# Patient Record
Sex: MALE | Race: WHITE | NOT HISPANIC OR LATINO | Employment: OTHER | URBAN - METROPOLITAN AREA
[De-identification: names, ages, dates, MRNs, and addresses within clinical notes are randomized per-mention and may not be internally consistent; named-entity substitution may affect disease eponyms.]

---

## 2017-12-05 ENCOUNTER — OFFICE VISIT (OUTPATIENT)
Dept: OTOLARYNGOLOGY | Facility: CLINIC | Age: 72
End: 2017-12-05
Payer: MEDICARE

## 2017-12-05 VITALS — BODY MASS INDEX: 24.34 KG/M2 | WEIGHT: 160.06 LBS

## 2017-12-05 DIAGNOSIS — G90.2 ACQUIRED HORNER'S SYNDROME: Primary | ICD-10-CM

## 2017-12-05 PROCEDURE — 99999 PR PBB SHADOW E&M-NEW PATIENT-LVL III: CPT | Mod: PBBFAC,,, | Performed by: OTOLARYNGOLOGY

## 2017-12-05 PROCEDURE — 99204 OFFICE O/P NEW MOD 45 MIN: CPT | Mod: S$PBB,,, | Performed by: OTOLARYNGOLOGY

## 2017-12-05 PROCEDURE — 99203 OFFICE O/P NEW LOW 30 MIN: CPT | Mod: PBBFAC | Performed by: OTOLARYNGOLOGY
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
## 2017-12-06 ENCOUNTER — TELEPHONE (OUTPATIENT)
Dept: OPHTHALMOLOGY | Facility: CLINIC | Age: 72
End: 2017-12-06

## 2017-12-06 NOTE — TELEPHONE ENCOUNTER
----- Message from Diana Plata RN sent at 12/6/2017 10:57 AM CST -----  Regarding: referral  Mr. Cortez has a referral placed by Dr. Frausto to see Dr. Ibrahim regarding Agatha's syndrome, eyelid asymmetry. Please call pt to place an appt.     Thank you  Diana Plata RN  [id number]

## 2018-02-08 ENCOUNTER — INITIAL CONSULT (OUTPATIENT)
Dept: OPHTHALMOLOGY | Facility: CLINIC | Age: 73
End: 2018-02-08
Payer: MEDICARE

## 2018-02-08 ENCOUNTER — CLINICAL SUPPORT (OUTPATIENT)
Dept: OPHTHALMOLOGY | Facility: CLINIC | Age: 73
End: 2018-02-08
Payer: MEDICARE

## 2018-02-08 DIAGNOSIS — H02.402 ACQUIRED PTOSIS OF LEFT EYELID: ICD-10-CM

## 2018-02-08 DIAGNOSIS — G90.2 HORNER'S SYNDROME: Primary | ICD-10-CM

## 2018-02-08 DIAGNOSIS — D31.32 CHOROIDAL NEVUS OF LEFT EYE: ICD-10-CM

## 2018-02-08 PROCEDURE — 92285 EXTERNAL OCULAR PHOTOGRAPHY: CPT | Mod: PBBFAC | Performed by: OPHTHALMOLOGY

## 2018-02-08 PROCEDURE — 92004 COMPRE OPH EXAM NEW PT 1/>: CPT | Mod: S$PBB,,, | Performed by: OPHTHALMOLOGY

## 2018-02-08 PROCEDURE — 99211 OFF/OP EST MAY X REQ PHY/QHP: CPT | Mod: PBBFAC,25,27

## 2018-02-08 PROCEDURE — 99999 PR PBB SHADOW E&M-EST. PATIENT-LVL II: CPT | Mod: PBBFAC,,, | Performed by: OPHTHALMOLOGY

## 2018-02-08 PROCEDURE — 99212 OFFICE O/P EST SF 10 MIN: CPT | Mod: PBBFAC,25 | Performed by: OPHTHALMOLOGY

## 2018-02-08 PROCEDURE — 99999 PR PBB SHADOW E&M-EST. PATIENT-LVL I: CPT | Mod: PBBFAC,,,

## 2018-02-08 PROCEDURE — 92082 INTERMEDIATE VISUAL FIELD XM: CPT | Mod: PBBFAC | Performed by: OPHTHALMOLOGY
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
## 2018-02-08 NOTE — LETTER
February 14, 2018      Ant Frausto MD  [address]  [facility] [address]           [facility]  [address]  [facility] [address]  Phone: [phone]  Fax: [phone]          Patient: Derick Cortez   MR Number: [id number]   YOB: 1945   Date of Visit: 2/8/2018       Dear Dr. Ant Frausto:    Thank you for referring Derick Cortez to me for evaluation. Attached you will find relevant portions of my assessment and plan of care.    If you have questions, please do not hesitate to call me. I look forward to following Derick Cortez along with you.    Sincerely,    Ant Harper  CC:  No Recipients    If you would like to receive this communication electronically, please contact [email or web address] or [phone] to request more information on [employer] access.    For providers and/or their staff who would like to refer a patient to [facility], please contact us through our one-stop-shop provider referral line, [facility] , at [phone].    If you feel you have received this communication in error or would no longer like to receive these types of communications, please e-mail [email or web address]

## 2018-02-21 ENCOUNTER — TELEPHONE (OUTPATIENT)
Dept: OPHTHALMOLOGY | Facility: CLINIC | Age: 73
End: 2018-02-21

## 2018-02-21 DIAGNOSIS — H02.402 ACQUIRED PTOSIS OF EYELID, LEFT: Primary | ICD-10-CM

## 2018-02-28 ENCOUNTER — INITIAL CONSULT (OUTPATIENT)
Dept: OPHTHALMOLOGY | Facility: CLINIC | Age: 73
End: 2018-02-28
Payer: MEDICARE

## 2018-02-28 DIAGNOSIS — H35.413 LATTICE DEGENERATION OF BOTH RETINAS: ICD-10-CM

## 2018-02-28 DIAGNOSIS — D31.32 CHOROIDAL NEVUS, LEFT EYE: Primary | ICD-10-CM

## 2018-02-28 PROCEDURE — 92014 COMPRE OPH EXAM EST PT 1/>: CPT | Mod: S$PBB,,, | Performed by: OPHTHALMOLOGY

## 2018-02-28 PROCEDURE — 92225 PR SPECIAL EYE EXAM, INITIAL: CPT | Mod: 50,PBBFAC | Performed by: OPHTHALMOLOGY

## 2018-02-28 PROCEDURE — 99212 OFFICE O/P EST SF 10 MIN: CPT | Mod: PBBFAC,25 | Performed by: OPHTHALMOLOGY

## 2018-02-28 PROCEDURE — 92225 PR SPECIAL EYE EXAM, INITIAL: CPT | Mod: 50,S$PBB,, | Performed by: OPHTHALMOLOGY

## 2018-02-28 PROCEDURE — 76512 OPH US DX B-SCAN: CPT | Mod: 50,PBBFAC | Performed by: OPHTHALMOLOGY

## 2018-02-28 PROCEDURE — 99999 PR PBB SHADOW E&M-EST. PATIENT-LVL II: CPT | Mod: PBBFAC,,, | Performed by: OPHTHALMOLOGY

## 2018-02-28 NOTE — LETTER
March 4, 2018      Erlinda Ibrahim MD  [address]  [facility] [address]           [facility]  [address]  [facility] [address]  Phone: [phone]  Fax: [phone]          Patient: Derick Cortez   MR Number: [id number]   YOB: 1945   Date of Visit: 2/28/2018       Dear Dr. Erlinda Ibrahim:    Thank you for referring Derick Cortez to me for evaluation. Attached you will find relevant portions of my assessment and plan of care.    If you have questions, please do not hesitate to call me. I look forward to following Derick Cortez along with you.    Sincerely,    Dawit Manriquez MD    Enclosure  CC:  No Recipients    If you would like to receive this communication electronically, please contact [email or web address] or [phone] to request more information on [employer] access.    For providers and/or their staff who would like to refer a patient to [facility], please contact us through our one-stop-shop provider referral line, [facility], at [phone].    If you feel you have received this communication in error or would no longer like to receive these types of communications, please e-mail [email or web address]

## 2018-02-28 NOTE — PROGRESS NOTES
HPI     Eye Problem    Additional comments: referred by Dr. Ibrahim           Comments   DLS:  2/8/18 Dr. Ibrahim- pt referred for eval of choroidal nevus OS    Pt states he was unaware of any probs with his retina.  Denies F/F.  Pt   gets yearly exams and not told of anything.  Set up for surgery with Dr. Ibrahim for 5/11/18 for his lid.    Va good OU.  No pain.  No new distortions OU.    POHX:  Agatha's syndrome             Last edited by Dawit Manriquez MD on 3/4/2018  4:56 PM. (History)        Bscan:  OD: good quality scan, 1.53 mm elevation isodense choroidal elevation without fluid or excavation    Assessment /Plan     For exam results, see Encounter Report.    Choroidal nevus, left eye  -     Color Fundus Photography - OU - Both Eyes; Future; Expected date: 02/28/2018  -     B Scan    Lattice degeneration of both retinas    Other orders  -     Cancel: OCT, Retina - OU - Both Eyes; Future; Expected date: 02/28/2018      Nevus with benign features, slight elevation  Recommend observation  RTC 3 months, sooner PRN    Pathology of PVD, Retinal Tear, Retinal Detachment reviewed in great detail  RD precautions discussed in detail, patient expressed understanding  RTC  sooner PRN (especially ANY change flashes, floaters, vision, visual field)

## 2018-03-07 ENCOUNTER — OFFICE VISIT (OUTPATIENT)
Dept: CARDIOLOGY | Facility: CLINIC | Age: 73
End: 2018-03-07
Attending: INTERNAL MEDICINE
Payer: MEDICARE

## 2018-03-07 VITALS
SYSTOLIC BLOOD PRESSURE: 126 MMHG | HEART RATE: 69 BPM | DIASTOLIC BLOOD PRESSURE: 81 MMHG | HEIGHT: 68 IN | BODY MASS INDEX: 23.64 KG/M2 | WEIGHT: 156 LBS

## 2018-03-07 DIAGNOSIS — I10 ESSENTIAL HYPERTENSION: ICD-10-CM

## 2018-03-07 DIAGNOSIS — N18.4 CHRONIC RENAL FAILURE, STAGE 4 (SEVERE): Primary | ICD-10-CM

## 2018-03-07 PROCEDURE — 99213 OFFICE O/P EST LOW 20 MIN: CPT | Mod: S$GLB,,, | Performed by: INTERNAL MEDICINE

## 2018-03-07 NOTE — PROGRESS NOTES
Subjective:    Patient ID:  Derick Cortez is a 72 y.o. male     HPI  Here for F/U of HBP and CRF.    I feel well. I work out regularly. No complaints.    Current Outpatient Prescriptions   Medication Sig    amlodipine (NORVASC) 10 MG tablet Take 10 mg by mouth once daily.    aspirin (ECOTRIN) 81 MG EC tablet Take 81 mg by mouth once daily.    CRESTOR 20 mg tablet     ergocalciferol (ERGOCALCIFEROL) 50,000 unit Cap     levothyroxine (SYNTHROID) 50 MCG tablet Take 50 mcg by mouth once daily.    metoprolol succinate (TOPROL-XL) 25 MG 24 hr tablet     rosuvastatin (CRESTOR) 10 MG tablet Take 10 mg by mouth once daily.     No current facility-administered medications for this visit.          Review of Systems   Constitution: Negative for chills, decreased appetite, fever, weight gain and weight loss.   HENT: Negative for congestion, hearing loss and sore throat.    Eyes: Negative for blurred vision, double vision and visual disturbance.   Cardiovascular: Negative for chest pain, claudication, dyspnea on exertion, leg swelling, palpitations and syncope.   Respiratory: Negative for cough, hemoptysis, shortness of breath, sputum production and wheezing.    Endocrine: Negative for cold intolerance and heat intolerance.   Hematologic/Lymphatic: Negative for bleeding problem. Does not bruise/bleed easily.   Skin: Negative for color change, dry skin, flushing and itching.   Musculoskeletal: Negative for back pain, joint pain and myalgias.   Gastrointestinal: Negative for abdominal pain, anorexia, constipation, diarrhea, dysphagia, nausea and vomiting.        No bleeding per rectum   Genitourinary: Negative for dysuria, flank pain, frequency, hematuria and nocturia.   Neurological: Negative for dizziness, headaches, light-headedness, loss of balance, seizures and tremors.   Psychiatric/Behavioral: Negative for altered mental status and depression.         Vitals:    03/07/18 1501   BP: 126/81   Pulse: 69   Weight: 70.8  "kg (156 lb)   Height: 5' 8" (1.727 m)     Objective:    Physical Exam   Constitutional: He is oriented to person, place, and time. He appears well-developed and well-nourished.   HENT:   Head: Normocephalic and atraumatic.   Right Ear: External ear normal.   Left Ear: External ear normal.   Nose: Nose normal.   Eyes: Conjunctivae and EOM are normal. Pupils are equal, round, and reactive to light. No scleral icterus.   Neck: Normal range of motion. Neck supple. No JVD present. No tracheal deviation present. No thyromegaly present.   Cardiovascular: Normal rate, regular rhythm and normal heart sounds.  Exam reveals no gallop and no friction rub.    No murmur heard.  Pulmonary/Chest: Effort normal and breath sounds normal. No respiratory distress. He has no rales. He exhibits no tenderness.   Abdominal: Soft. Bowel sounds are normal. He exhibits no distension and no mass. There is no tenderness.   Musculoskeletal: Normal range of motion. He exhibits no edema or tenderness.   Lymphadenopathy:     He has no cervical adenopathy.   Neurological: He is alert and oriented to person, place, and time. He has normal reflexes. No cranial nerve deficit. Coordination normal.   Skin: Skin is warm and dry. No rash noted.   Psychiatric: He has a normal mood and affect. His behavior is normal.         Assessment:       1. Chronic renal failure, stage 4 (severe)    2. Essential hypertension         Plan:       Stable  Continue the same            "

## 2018-03-26 ENCOUNTER — PATIENT MESSAGE (OUTPATIENT)
Dept: SURGERY | Facility: HOSPITAL | Age: 73
End: 2018-03-26

## 2018-05-10 ENCOUNTER — ANESTHESIA EVENT (OUTPATIENT)
Dept: SURGERY | Facility: HOSPITAL | Age: 73
End: 2018-05-10
Payer: MEDICARE

## 2018-05-10 ENCOUNTER — TELEPHONE (OUTPATIENT)
Dept: OPHTHALMOLOGY | Facility: CLINIC | Age: 73
End: 2018-05-10
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
## 2018-05-11 ENCOUNTER — HOSPITAL ENCOUNTER (OUTPATIENT)
Facility: HOSPITAL | Age: 73
Discharge: HOME | End: 2018-05-11
Attending: OPHTHALMOLOGY | Admitting: OPHTHALMOLOGY
Payer: MEDICARE

## 2018-05-11 ENCOUNTER — SURGERY (OUTPATIENT)
Age: 73
End: 2018-05-11

## 2018-05-11 ENCOUNTER — ANESTHESIA (OUTPATIENT)
Dept: SURGERY | Facility: HOSPITAL | Age: 73
End: 2018-05-11
Payer: MEDICARE

## 2018-05-11 VITALS
SYSTOLIC BLOOD PRESSURE: 127 MMHG | HEIGHT: 68 IN | HEART RATE: 68 BPM | TEMPERATURE: 98 F | OXYGEN SATURATION: 97 % | DIASTOLIC BLOOD PRESSURE: 73 MMHG | RESPIRATION RATE: 16 BRPM | BODY MASS INDEX: 23.65 KG/M2 | WEIGHT: 156.06 LBS

## 2018-05-11 DIAGNOSIS — H02.409 PTOSIS: ICD-10-CM

## 2018-05-11 DIAGNOSIS — H02.402 PTOSIS OF LEFT EYELID: Primary | ICD-10-CM

## 2018-05-11 DIAGNOSIS — H02.402 ACQUIRED PTOSIS OF EYELID, LEFT: ICD-10-CM

## 2018-05-11 PROCEDURE — 36000706: Performed by: OPHTHALMOLOGY

## 2018-05-11 PROCEDURE — 37000009 HC ANESTHESIA EA ADD 15 MINS: Performed by: OPHTHALMOLOGY

## 2018-05-11 PROCEDURE — 71000015 HC POSTOP RECOV 1ST HR: Performed by: OPHTHALMOLOGY

## 2018-05-11 PROCEDURE — 25000003 PHARM REV CODE 250: Performed by: OPHTHALMOLOGY

## 2018-05-11 PROCEDURE — 63600175 PHARM REV CODE 636 W HCPCS: Performed by: NURSE ANESTHETIST, CERTIFIED REGISTERED

## 2018-05-11 PROCEDURE — 71000033 HC RECOVERY, INTIAL HOUR: Performed by: OPHTHALMOLOGY

## 2018-05-11 PROCEDURE — 67903 REPAIR EYELID DEFECT: CPT | Mod: E1,,, | Performed by: OPHTHALMOLOGY

## 2018-05-11 PROCEDURE — 37000008 HC ANESTHESIA 1ST 15 MINUTES: Performed by: OPHTHALMOLOGY

## 2018-05-11 PROCEDURE — 25000003 PHARM REV CODE 250: Performed by: NURSE ANESTHETIST, CERTIFIED REGISTERED

## 2018-05-11 PROCEDURE — 36000707: Performed by: OPHTHALMOLOGY

## 2018-05-11 PROCEDURE — D9220A PRA ANESTHESIA: Mod: CRNA,,, | Performed by: NURSE ANESTHETIST, CERTIFIED REGISTERED

## 2018-05-11 PROCEDURE — D9220A PRA ANESTHESIA: Mod: ANES,,, | Performed by: ANESTHESIOLOGY

## 2018-05-11 PROCEDURE — S0020 INJECTION, BUPIVICAINE HYDRO: HCPCS | Performed by: OPHTHALMOLOGY

## 2018-05-11 PROCEDURE — 71000016 HC POSTOP RECOV ADDL HR: Performed by: OPHTHALMOLOGY

## 2018-05-11 RX ORDER — TOBRAMYCIN AND DEXAMETHASONE 3; 1 MG/ML; MG/ML
1 SUSPENSION/ DROPS OPHTHALMIC 4 TIMES DAILY
Qty: 5 ML | Refills: 0 | Status: SHIPPED | OUTPATIENT
Start: 2018-05-11 | End: 2018-05-31

## 2018-05-11 RX ORDER — TETRACAINE HYDROCHLORIDE 5 MG/ML
1 SOLUTION OPHTHALMIC ONCE
Status: DISCONTINUED | OUTPATIENT
Start: 2018-05-11 | End: 2018-05-11 | Stop reason: HOSPADM

## 2018-05-11 RX ORDER — HYDROMORPHONE HYDROCHLORIDE 1 MG/ML
0.2 INJECTION, SOLUTION INTRAMUSCULAR; INTRAVENOUS; SUBCUTANEOUS EVERY 5 MIN PRN
Status: DISCONTINUED | OUTPATIENT
Start: 2018-05-11 | End: 2018-05-11 | Stop reason: HOSPADM

## 2018-05-11 RX ORDER — BUPIVACAINE HYDROCHLORIDE 5 MG/ML
INJECTION, SOLUTION EPIDURAL; INTRACAUDAL
Status: DISCONTINUED | OUTPATIENT
Start: 2018-05-11 | End: 2018-05-11 | Stop reason: HOSPADM

## 2018-05-11 RX ORDER — LORAZEPAM 2 MG/ML
0.25 INJECTION INTRAMUSCULAR ONCE AS NEEDED
Status: DISCONTINUED | OUTPATIENT
Start: 2018-05-11 | End: 2018-05-11 | Stop reason: HOSPADM

## 2018-05-11 RX ORDER — MIDAZOLAM HYDROCHLORIDE 1 MG/ML
INJECTION, SOLUTION INTRAMUSCULAR; INTRAVENOUS
Status: DISCONTINUED | OUTPATIENT
Start: 2018-05-11 | End: 2018-05-11

## 2018-05-11 RX ORDER — LIDOCAINE HCL/EPINEPHRINE/PF 2%-1:200K
VIAL (ML) INJECTION
Status: DISCONTINUED | OUTPATIENT
Start: 2018-05-11 | End: 2018-05-11 | Stop reason: HOSPADM

## 2018-05-11 RX ORDER — OXYCODONE AND ACETAMINOPHEN 5; 325 MG/1; MG/1
1 TABLET ORAL EVERY 6 HOURS PRN
Qty: 16 TABLET | Refills: 0 | Status: SHIPPED | OUTPATIENT
Start: 2018-05-11 | End: 2018-06-12

## 2018-05-11 RX ORDER — PROPOFOL 10 MG/ML
VIAL (ML) INTRAVENOUS
Status: DISCONTINUED | OUTPATIENT
Start: 2018-05-11 | End: 2018-05-11

## 2018-05-11 RX ORDER — LIDOCAINE HCL/EPINEPHRINE/PF 2%-1:200K
VIAL (ML) INJECTION
Status: DISCONTINUED
Start: 2018-05-11 | End: 2018-05-11 | Stop reason: HOSPADM

## 2018-05-11 RX ORDER — SODIUM CHLORIDE 9 MG/ML
INJECTION, SOLUTION INTRAVENOUS CONTINUOUS PRN
Status: DISCONTINUED | OUTPATIENT
Start: 2018-05-11 | End: 2018-05-11

## 2018-05-11 RX ORDER — FENTANYL CITRATE 50 UG/ML
INJECTION, SOLUTION INTRAMUSCULAR; INTRAVENOUS
Status: DISCONTINUED | OUTPATIENT
Start: 2018-05-11 | End: 2018-05-11

## 2018-05-11 RX ORDER — SODIUM CHLORIDE 0.9 % (FLUSH) 0.9 %
3 SYRINGE (ML) INJECTION
Status: DISCONTINUED | OUTPATIENT
Start: 2018-05-11 | End: 2018-05-11 | Stop reason: HOSPADM

## 2018-05-11 RX ORDER — EPHEDRINE SULFATE 50 MG/ML
INJECTION, SOLUTION INTRAVENOUS
Status: DISCONTINUED | OUTPATIENT
Start: 2018-05-11 | End: 2018-05-11

## 2018-05-11 RX ORDER — TETRACAINE HYDROCHLORIDE 5 MG/ML
SOLUTION OPHTHALMIC
Status: DISCONTINUED
Start: 2018-05-11 | End: 2018-05-11 | Stop reason: WASHOUT

## 2018-05-11 RX ORDER — LIDOCAINE HCL/PF 100 MG/5ML
SYRINGE (ML) INTRAVENOUS
Status: DISCONTINUED | OUTPATIENT
Start: 2018-05-11 | End: 2018-05-11

## 2018-05-11 RX ORDER — BUPIVACAINE HYDROCHLORIDE 5 MG/ML
INJECTION, SOLUTION EPIDURAL; INTRACAUDAL
Status: DISCONTINUED
Start: 2018-05-11 | End: 2018-05-11 | Stop reason: HOSPADM

## 2018-05-11 RX ADMIN — EPHEDRINE SULFATE 10 MG: 50 INJECTION, SOLUTION INTRAMUSCULAR; INTRAVENOUS; SUBCUTANEOUS at 01:05

## 2018-05-11 RX ADMIN — SODIUM CHLORIDE: 0.9 INJECTION, SOLUTION INTRAVENOUS at 09:05

## 2018-05-11 RX ADMIN — MIDAZOLAM HYDROCHLORIDE 1 MG: 1 INJECTION, SOLUTION INTRAMUSCULAR; INTRAVENOUS at 12:05

## 2018-05-11 RX ADMIN — PROPOFOL 170 MG: 10 INJECTION, EMULSION INTRAVENOUS at 12:05

## 2018-05-11 RX ADMIN — EPHEDRINE SULFATE 10 MG: 50 INJECTION, SOLUTION INTRAMUSCULAR; INTRAVENOUS; SUBCUTANEOUS at 12:05

## 2018-05-11 RX ADMIN — LIDOCAINE HYDROCHLORIDE 80 MG: 20 INJECTION, SOLUTION INTRAVENOUS at 12:05

## 2018-05-11 RX ADMIN — TOBRAMYCIN AND DEXAMETHASONE 1 APPLICATION: 3; 1 OINTMENT OPHTHALMIC at 01:05

## 2018-05-11 RX ADMIN — BUPIVACAINE HYDROCHLORIDE 4.5 ML: 5 INJECTION, SOLUTION EPIDURAL; INTRACAUDAL; PERINEURAL at 12:05

## 2018-05-11 RX ADMIN — LIDOCAINE HYDROCHLORIDE AND EPINEPHRINE 4.5 ML: 20; 5 INJECTION, SOLUTION EPIDURAL; INFILTRATION; INTRACAUDAL; PERINEURAL at 01:05

## 2018-05-11 RX ADMIN — FENTANYL CITRATE 50 MCG: 50 INJECTION, SOLUTION INTRAMUSCULAR; INTRAVENOUS at 12:05

## 2018-05-11 NOTE — ANESTHESIA POSTPROCEDURE EVALUATION
"Anesthesia Post Evaluation    Patient: Derick Cortez    Procedure(s) Performed: Procedure(s) (LRB):  REPAIR-PTOSIS (Left)    Final Anesthesia Type: general  Patient location during evaluation: PACU  Patient participation: Yes- Able to Participate  Level of consciousness: awake and alert and oriented  Post-procedure vital signs: reviewed and stable  Pain management: adequate  Airway patency: patent  PONV status at discharge: No PONV  Anesthetic complications: no      Cardiovascular status: blood pressure returned to baseline and hemodynamically stable  Respiratory status: unassisted and spontaneous ventilation  Hydration status: euvolemic  Follow-up not needed.        Visit Vitals  /73   Pulse 68   Temp 36.6 °C (97.9 °F) (Temporal)   Resp 16   Ht 5' 8" (1.727 m)   Wt 70.8 kg (156 lb 1.4 oz)   SpO2 97%   BMI 23.73 kg/m²       Pain/Julio Cesar Score: Pain Assessment Performed: Yes (5/11/2018  2:49 PM)  Presence of Pain: denies (5/11/2018  2:49 PM)  Julio Cesar Score: 10 (5/11/2018  2:49 PM)      "

## 2018-05-11 NOTE — OP NOTE
OPERATIVE NOTE    DATE OF PROCEDURE: 5/11/2018    Attending: NYA Ibrahim M.D.    Resident: UZMA Serrato M.D.    Procedure: 10mm Muellerectomy, left upper eyelid (55400)    Pre-op Dx:   1. Acquired ptosis of the left eye  2. Agatha's Syndrome, left eye    Post-op Dx: same    Anesthesia: General with LMA and Local (2% lidocaine with epinephrine)    Specimens: None    Complications: None    Blood Loss: minimal    Indications for surgery: This 72-year-old male presenting with visually significant ptosis of the left eye with a history of left-sided Agatha's syndrome secondary to remote trauma. Patient reports decreased visual field superiorly which is affecting activities of daily living. Informed consent obtained after extensive risks/benefits/alternatives were discussed with the patient including but not limited to pain, bleeding, infection, ocular injury, loss of the eye, asymmetry, need for revision in future, scarring.  Alternatives such as waiting were discussed.  All questions were answered.       PROCEDURE IN DETAIL:     Once in the operating suite, local anesthesia was used on the patient's left upper eyelid to achieve local hemostasis.  The patient was prepped and draped in the usual sterile manner for ophthalmic plastic surgery. The pupil was marked on the eyelid skin preoperatively. Attention was turned to the left eye.  A 4-0 silk traction suture was placed through the grey line.  A desmarres retractor was used to michael the eyelid.  Three marks were made with a surgical pen 5 mm from the border of the superior tarsus laterally, centrally, and medially. One additional pranav was placed 5 mm from the initial 5 mm central pranav. Three traction sutures taking bites through the conjunctiva and Lr's muscle were placed laterally, centrally, and medially. The sutures were tied into two different bundles with equal tension. Both bundles were elevated with equal tension while the ptosis clamp was applied to encompass the  central pranav. A 6-0 Prolene on P-3 suture was placed through the skin and exiting beneath the clamp at the medial most aspect into conjunctiva. The suture was run in horizontal mattress fashion along the clamp and exited out through the eyelid skin. The two ends of the running suture were held on tension toward the top of the patients head. A 15 blade was used to excise the days muscle and conjunctiva within the clamp. A desmarres was used to michael the eyelid, and no silk suture fragments were removed.  The suture was tied lightly on the eyelid. Tobradex la was applied to the suture and in the eye.  The patient tolerated the procedure well without complication and was taken to the recovery area in good condition.

## 2018-05-11 NOTE — TRANSFER OF CARE
"Anesthesia Transfer of Care Note    Patient: Derick Cortez    Procedure(s) Performed: Procedure(s) (LRB):  REPAIR-PTOSIS (Left)    Patient location: PACU    Anesthesia Type: epidural    Transport from OR: Transported from OR on 6-10 L/min O2 by face mask with adequate spontaneous ventilation    Post pain: adequate analgesia    Post assessment: no apparent anesthetic complications and tolerated procedure well    Post vital signs: stable    Level of consciousness: awake, alert and oriented    Nausea/Vomiting: no nausea/vomiting    Complications: none    Transfer of care protocol was followed      Last vitals:   Visit Vitals  /75   Pulse 69   Temp 36.6 °C (97.9 °F) (Temporal)   Resp 18   Ht 5' 8" (1.727 m)   Wt 70.8 kg (156 lb 1.4 oz)   SpO2 98%   BMI 23.73 kg/m²     "

## 2018-05-11 NOTE — PLAN OF CARE
Problem: Patient Care Overview  Goal: Plan of Care Review  Outcome: Outcome(s) achieved Date Met: 05/11/18    Discharge instructions  given to patient and spouse by Dr. Ibrahim. Verbalized understanding. Patient stable, tolerating fluids. No complaints at this time. Patient adequate for discharge.    Prescriptions delivered to pharmacy per spouse request. Waiting for bedside delivery from pharmacy prior to discharging patient.

## 2018-05-11 NOTE — BRIEF OP NOTE
Brief Operative Note  Ophthalmology Service      Date of Procedure: 5/11/2018     Attending Physician: SKY Ibrahim MD     Assistant: UZMA Serrato MD    Pre-Operative Diagnosis: Acquired ptosis of eyelid, left [H02.402]  Ptosis [H02.409]     Post-Operative Diagnosis: Same as pre-operative diagnosis    Treatments/Procedures: 10mm Muellerectomy, left eye    Intraoperative Findings: Visually significant ptosis of the left eye    Anesthesia: General with LMA    Complications: None    Estimated Blood Loss: < 5 cc    Specimens: None    -------------------------------------------------------------  Full dictated Operative Report to follow.  -------------------------------------------------------------

## 2018-05-11 NOTE — H&P
Pre-Operative History & Physical  Ophthalmology      SUBJECTIVE:     History of Present Illness:  Patient is a 72 y.o. male presents with Acquired ptosis of eyelid, left [H02.402]  Ptosis [H02.409].    MEDICATIONS:   PTA Medications   Medication Sig    amlodipine (NORVASC) 10 MG tablet Take 10 mg by mouth once daily.    aspirin (ECOTRIN) 81 MG EC tablet Take 81 mg by mouth once daily.    CRESTOR 20 mg tablet Take 20 mg by mouth every evening.     ergocalciferol (ERGOCALCIFEROL) 50,000 unit Cap     levothyroxine (SYNTHROID) 50 MCG tablet Take 50 mcg by mouth once daily.    metoprolol succinate (TOPROL-XL) 25 MG 24 hr tablet Take 25 mg by mouth every evening.        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     PAST SURGICAL HISTORY: History reviewed. No pertinent surgical history.  PAST FAMILY HISTORY: History reviewed. No pertinent family history.  SOCIAL HISTORY:   Social History   Substance Use Topics    Smoking status: Never Smoker    Smokeless tobacco: Never Used    Alcohol use Yes        MENTAL STATUS: Alert    REVIEW OF SYSTEMS: Negative    OBJECTIVE:     Vital Signs (Most Recent)  Temp: 98.2 °F (36.8 °C) (05/11/18 0952)  Pulse: 78 (05/11/18 0952)  Resp: 18 (05/11/18 0952)  BP: 110/80 (05/11/18 0952)  SpO2: 98 % (05/11/18 0952)    Physical Exam:  General: NAD  HEENT: Ptosis, left eye  Lungs: Adequate respirations  Heart: + pulses  Abdomen: Soft    ASSESSMENT/PLAN:     Patient is a 72 y.o. male with Acquired ptosis of eyelid, left [H02.402]  Ptosis [H02.409].     - Proceed to OR for ptosis repair of the left eye

## 2018-05-11 NOTE — DISCHARGE SUMMARY
Discharge Summary  Ophthalmology Service    Admit Date: 5/11/2018     Discharge Date: 5/11/2018     Attending Physician: SKY Ibrahim MD     Discharge Physician: UZMA Serrato MD    Discharged Condition: Good    Reason for Admission: Acquired ptosis of eyelid, left [H02.402]  Ptosis [H02.409]     Treatments/Procedures: 10mm Muellerectomy, left eye (see dictated report for details).    Disposition: Home    Patient Instructions:   - Resume same diet as prior to surgery  - Resume activity as tolerated  - Apply ice packs to surgical eye(s) for 72 hours as tolerated  - Apply ointment to suture wounds as instructed  - Ophthalmology clinic to schedule an appointment with Dr. Ibrahim in 1 week(s).    Patient Instructions:   Current Discharge Medication List      CONTINUE these medications which have NOT CHANGED    Details   amlodipine (NORVASC) 10 MG tablet Take 10 mg by mouth once daily.      aspirin (ECOTRIN) 81 MG EC tablet Take 81 mg by mouth once daily.      CRESTOR 20 mg tablet Take 20 mg by mouth every evening.       ergocalciferol (ERGOCALCIFEROL) 50,000 unit Cap       levothyroxine (SYNTHROID) 50 MCG tablet Take 50 mcg by mouth once daily.      metoprolol succinate (TOPROL-XL) 25 MG 24 hr tablet Take 25 mg by mouth every evening.              No discharge procedures on file.

## 2018-05-18 ENCOUNTER — OFFICE VISIT (OUTPATIENT)
Dept: OPHTHALMOLOGY | Facility: CLINIC | Age: 73
End: 2018-05-18
Payer: MEDICARE

## 2018-05-18 DIAGNOSIS — Z98.890 POSTOPERATIVE EYE STATE: Primary | ICD-10-CM

## 2018-05-18 PROCEDURE — 99024 POSTOP FOLLOW-UP VISIT: CPT | Mod: POP,,, | Performed by: OPHTHALMOLOGY

## 2018-05-18 PROCEDURE — 99212 OFFICE O/P EST SF 10 MIN: CPT | Mod: PBBFAC | Performed by: OPHTHALMOLOGY

## 2018-05-18 PROCEDURE — 99999 PR PBB SHADOW E&M-EST. PATIENT-LVL II: CPT | Mod: PBBFAC,,, | Performed by: OPHTHALMOLOGY

## 2018-05-18 NOTE — PROGRESS NOTES
HPI     POW S/P muellerectomy OS    Last edited by Mario Serrato MD on 5/18/2018  9:58 AM. (History)        ROS     Positive for: Eyes    Negative for: Constitutional, Gastrointestinal, Neurological, Skin,   Genitourinary, Musculoskeletal, HENT, Endocrine, Cardiovascular,   Respiratory, Psychiatric, Allergic/Imm, Heme/Lymph    Last edited by Mario Serrato MD on 5/18/2018  9:58 AM. (History)        Assessment /Plan     For exam results, see Encounter Report.    Postoperative eye state      POW1 S/P 10mm Muellerectomy   Happy with results, lids symmetric MRD1 3.5 // 4.0   Tr lag OS, K clear no SPK   Decr gtts to BID x 2 wks then stop   Iván qhs x 1 wk then stop    Patient doing well! Post-operative instructions reviewed. Sutures removed. All questions answered.  Return in 3 weeks prn sooner any worsening of vision/symptoms or any concerns.    I have personally interviewed the patient, reviewed the history and examined the patient and agree with the technician's &/or resident's exam, assessment and plan.    Vipul Pearson MD

## 2018-05-30 ENCOUNTER — OFFICE VISIT (OUTPATIENT)
Dept: OPHTHALMOLOGY | Facility: CLINIC | Age: 73
End: 2018-05-30
Payer: MEDICARE

## 2018-05-30 DIAGNOSIS — D31.32 CHOROIDAL NEVUS, LEFT EYE: Primary | ICD-10-CM

## 2018-05-30 DIAGNOSIS — H35.413 LATTICE DEGENERATION OF BOTH RETINAS: ICD-10-CM

## 2018-05-30 DIAGNOSIS — H02.402 ACQUIRED PTOSIS OF EYELID, LEFT: ICD-10-CM

## 2018-05-30 PROCEDURE — 92014 COMPRE OPH EXAM EST PT 1/>: CPT | Mod: S$PBB,,, | Performed by: OPHTHALMOLOGY

## 2018-05-30 PROCEDURE — 99999 PR PBB SHADOW E&M-EST. PATIENT-LVL II: CPT | Mod: PBBFAC,,, | Performed by: OPHTHALMOLOGY

## 2018-05-30 PROCEDURE — 76512 OPH US DX B-SCAN: CPT | Mod: 50,PBBFAC | Performed by: OPHTHALMOLOGY

## 2018-05-30 PROCEDURE — 99212 OFFICE O/P EST SF 10 MIN: CPT | Mod: PBBFAC | Performed by: OPHTHALMOLOGY

## 2018-05-30 PROCEDURE — 92250 FUNDUS PHOTOGRAPHY W/I&R: CPT | Mod: PBBFAC | Performed by: OPHTHALMOLOGY

## 2018-05-30 NOTE — PROGRESS NOTES
HPI     DLS 2/28/1---Dr Manriquez   Here for f/u choroidal nevus as instructed    Pt states that VA is stable ----denies eye pian, flashes or floaters   No new distortions  H/o: Horners  Syndrome    S/p Ptosis Repair with Dr Ibrahim       Last edited by Dawit Manriquez MD on 5/30/2018  8:21 PM. (History)      Bscan:  OS: good quality scan, 1.42 mm elevation isodense choroidal elevation without fluid or excavation, stable (1.53 mm last visit)    Fundus photos   OS: good quality photo, approx 5 mm sl elevated pigmented choroidal lesion with drusen and pigment scar on surface, no OP-stable from 2/28/18 scan    Assessment /Plan     For exam results, see Encounter Report.    Choroidal nevus, left eye  -     Color Fundus Photography - OU - Both Eyes  -     Color Fundus Photography - OU - Both Eyes; Future  -     B Scan; Future  -     B Scan    Lattice degeneration of both retinas    Acquired ptosis of eyelid, left      OS doing well s/p ptosis repair.  Management and f/u per Dr Ibrahim    CN OS stable compared to imaging and exam 3 months ago  Last visit was first observation  Rec to f/u 6 months with repeat testing and eval    Stable lattice without breaks.  RD precautions

## 2018-06-12 ENCOUNTER — OFFICE VISIT (OUTPATIENT)
Dept: OPHTHALMOLOGY | Facility: CLINIC | Age: 73
End: 2018-06-12
Payer: MEDICARE

## 2018-06-12 DIAGNOSIS — G90.2 HORNER'S SYNDROME: ICD-10-CM

## 2018-06-12 DIAGNOSIS — D31.32 CHOROIDAL NEVUS, LEFT EYE: ICD-10-CM

## 2018-06-12 DIAGNOSIS — H02.402 ACQUIRED PTOSIS OF EYELID, LEFT: ICD-10-CM

## 2018-06-12 DIAGNOSIS — Z98.890 POST-OPERATIVE STATE: Primary | ICD-10-CM

## 2018-06-12 PROCEDURE — 99024 POSTOP FOLLOW-UP VISIT: CPT | Mod: POP,,, | Performed by: OPHTHALMOLOGY

## 2018-06-12 PROCEDURE — 92285 EXTERNAL OCULAR PHOTOGRAPHY: CPT | Mod: PBBFAC | Performed by: OPHTHALMOLOGY

## 2018-06-12 PROCEDURE — 99999 PR PBB SHADOW E&M-EST. PATIENT-LVL II: CPT | Mod: PBBFAC,,, | Performed by: OPHTHALMOLOGY

## 2018-06-12 PROCEDURE — 99212 OFFICE O/P EST SF 10 MIN: CPT | Mod: PBBFAC,25 | Performed by: OPHTHALMOLOGY

## 2018-06-12 RX ORDER — TOBRAMYCIN AND DEXAMETHASONE 3; 1 MG/ML; MG/ML
1 SUSPENSION/ DROPS OPHTHALMIC
COMMUNITY
End: 2018-07-12

## 2018-06-12 NOTE — PROGRESS NOTES
HPI     Here for 3 week f/u   S/p left mullerectomy 511/18  Sutures removed 5/18/18     Using TD drops, TD la bid    Last edited by Jailene Lucero on 6/12/2018 10:41 AM. (History)        ROS     Positive for: Eyes    Negative for: Constitutional, Gastrointestinal, Neurological, Skin,   Genitourinary, Musculoskeletal, HENT, Endocrine, Cardiovascular,   Respiratory, Psychiatric, Allergic/Imm, Heme/Lymph    Last edited by Mario Serrato MD on 6/12/2018 11:05 AM. (History)        Assessment /Plan     For exam results, see Encounter Report.    Post-operative state  -     External Photography - OU - Both Eyes    Acquired ptosis of eyelid, left    Choroidal nevus, left eye    Agatha's syndrome      Patient doing well! Post-operative instructions reviewed. All questions answered.     Lids symmetric, MRD1 3mm OU, no lag, no exposure keratopathy today    Can stop tobradex gtts    Photos today    RTC 1 month

## 2018-07-12 ENCOUNTER — OFFICE VISIT (OUTPATIENT)
Dept: OPHTHALMOLOGY | Facility: CLINIC | Age: 73
End: 2018-07-12
Payer: MEDICARE

## 2018-07-12 DIAGNOSIS — D31.32 CHOROIDAL NEVUS, LEFT EYE: ICD-10-CM

## 2018-07-12 DIAGNOSIS — Z98.890 POST-OPERATIVE STATE: Primary | ICD-10-CM

## 2018-07-12 DIAGNOSIS — H02.402 ACQUIRED PTOSIS OF EYELID, LEFT: ICD-10-CM

## 2018-07-12 PROCEDURE — 99024 POSTOP FOLLOW-UP VISIT: CPT | Mod: POP,,, | Performed by: OPHTHALMOLOGY

## 2018-07-12 PROCEDURE — 99999 PR PBB SHADOW E&M-EST. PATIENT-LVL II: CPT | Mod: PBBFAC,,, | Performed by: OPHTHALMOLOGY

## 2018-07-12 PROCEDURE — 92285 EXTERNAL OCULAR PHOTOGRAPHY: CPT | Mod: PBBFAC | Performed by: OPHTHALMOLOGY

## 2018-07-12 PROCEDURE — 99212 OFFICE O/P EST SF 10 MIN: CPT | Mod: PBBFAC | Performed by: OPHTHALMOLOGY

## 2018-07-12 NOTE — PROGRESS NOTES
HPI     71 YO male presents today for a post-op visit, S/P 10mm Muellerectomy,   left upper eyelid on 05/11/2018. He states he is doing well, no problems   or complaints.     Last edited by Arianna Yañez, PCT on 7/12/2018  9:42 AM. (History)            Assessment /Plan     For exam results, see Encounter Report.    Post-operative state  -     External/Slit Lamp Photography    Acquired ptosis of eyelid, left    Choroidal nevus, left eye      Patient doing well! Post-operative instructions reviewed. All questions answered. Return  prn sooner any worsening of vision/symptoms or any concerns.    AT's prn for mild exposure os.     Follow-up with Dr. Manriquez to monitor choroidal nevus.

## 2018-09-05 ENCOUNTER — OFFICE VISIT (OUTPATIENT)
Dept: CARDIOLOGY | Facility: CLINIC | Age: 73
End: 2018-09-05
Attending: INTERNAL MEDICINE
Payer: MEDICARE

## 2018-09-05 VITALS
SYSTOLIC BLOOD PRESSURE: 127 MMHG | WEIGHT: 158 LBS | DIASTOLIC BLOOD PRESSURE: 75 MMHG | BODY MASS INDEX: 23.95 KG/M2 | HEART RATE: 64 BPM | HEIGHT: 68 IN

## 2018-09-05 DIAGNOSIS — H02.402 ACQUIRED PTOSIS OF EYELID, LEFT: ICD-10-CM

## 2018-09-05 DIAGNOSIS — I10 ESSENTIAL HYPERTENSION: Primary | ICD-10-CM

## 2018-09-05 DIAGNOSIS — N18.4 CHRONIC RENAL FAILURE, STAGE 4 (SEVERE): ICD-10-CM

## 2018-09-05 PROCEDURE — 99214 OFFICE O/P EST MOD 30 MIN: CPT | Mod: S$GLB,,, | Performed by: INTERNAL MEDICINE

## 2018-09-05 PROCEDURE — 93000 ELECTROCARDIOGRAM COMPLETE: CPT | Mod: S$GLB,,, | Performed by: INTERNAL MEDICINE

## 2018-09-05 NOTE — PROGRESS NOTES
Subjective:    Patient ID:  Derick Cortez is a 72 y.o. male     HPI     Here for follow-up of essential hypertension, ptosis of left eye, chronic renal failure.      I am feeling well, I work out regularly, I have no complaints.    Current Outpatient Medications   Medication Sig    amlodipine (NORVASC) 10 MG tablet Take 10 mg by mouth once daily.    aspirin (ECOTRIN) 81 MG EC tablet Take 81 mg by mouth once daily.    CRESTOR 20 mg tablet Take 20 mg by mouth every evening.     ergocalciferol (ERGOCALCIFEROL) 50,000 unit Cap     levothyroxine (SYNTHROID) 50 MCG tablet Take 50 mcg by mouth once daily.    metoprolol succinate (TOPROL-XL) 25 MG 24 hr tablet Take 25 mg by mouth every evening.      No current facility-administered medications for this visit.          Review of Systems   Constitution: Negative for chills, decreased appetite, fever, weight gain and weight loss.   HENT: Negative for congestion, hearing loss and sore throat.    Eyes: Negative for blurred vision, double vision and visual disturbance.   Cardiovascular: Negative for chest pain, claudication, dyspnea on exertion, leg swelling, palpitations and syncope.   Respiratory: Negative for cough, hemoptysis, shortness of breath, sputum production and wheezing.    Endocrine: Negative for cold intolerance and heat intolerance.   Hematologic/Lymphatic: Negative for bleeding problem. Does not bruise/bleed easily.   Skin: Negative for color change, dry skin, flushing and itching.   Musculoskeletal: Negative for back pain, joint pain and myalgias.   Gastrointestinal: Negative for abdominal pain, anorexia, constipation, diarrhea, dysphagia, nausea and vomiting.        No bleeding per rectum   Genitourinary: Negative for dysuria, flank pain, frequency, hematuria and nocturia.   Neurological: Negative for dizziness, headaches, light-headedness, loss of balance, seizures and tremors.   Psychiatric/Behavioral: Negative for altered mental status and depression.  "        Vitals:    09/05/18 1109   BP: 127/75   Pulse: 64   Weight: 71.7 kg (158 lb)   Height: 5' 8" (1.727 m)     Objective:    Physical Exam   Constitutional: He is oriented to person, place, and time. He appears well-developed and well-nourished.   HENT:   Head: Normocephalic and atraumatic.   Right Ear: External ear normal.   Left Ear: External ear normal.   Nose: Nose normal.   Eyes: Conjunctivae and EOM are normal. Pupils are equal, round, and reactive to light. No scleral icterus.   Neck: Normal range of motion. Neck supple. No JVD present. No tracheal deviation present. No thyromegaly present.   Cardiovascular: Normal rate, regular rhythm and normal heart sounds. Exam reveals no gallop and no friction rub.   No murmur heard.  Pulmonary/Chest: Effort normal and breath sounds normal. No respiratory distress. He has no rales. He exhibits no tenderness.   Abdominal: Soft. Bowel sounds are normal. He exhibits no distension and no mass. There is no tenderness.   Musculoskeletal: Normal range of motion. He exhibits no edema or tenderness.   Lymphadenopathy:     He has no cervical adenopathy.   Neurological: He is alert and oriented to person, place, and time. He has normal reflexes. No cranial nerve deficit. Coordination normal.   Skin: Skin is warm and dry. No rash noted.   Psychiatric: He has a normal mood and affect. His behavior is normal.         Creatinine is 1.89    Assessment:       1. Essential hypertension    2. Acquired ptosis of eyelid, left    3. Chronic renal failure, stage 4 (severe)         Plan:        stable.   encouraged drinking lots of water.   continue the same medical regimen.            "

## 2018-12-03 ENCOUNTER — OFFICE VISIT (OUTPATIENT)
Dept: OPHTHALMOLOGY | Facility: CLINIC | Age: 73
End: 2018-12-03
Payer: MEDICARE

## 2018-12-03 VITALS — SYSTOLIC BLOOD PRESSURE: 115 MMHG | HEART RATE: 70 BPM | DIASTOLIC BLOOD PRESSURE: 74 MMHG

## 2018-12-03 DIAGNOSIS — D31.32 CHOROIDAL NEVUS, LEFT EYE: Primary | ICD-10-CM

## 2018-12-03 DIAGNOSIS — H35.413 LATTICE DEGENERATION OF BOTH RETINAS: ICD-10-CM

## 2018-12-03 PROCEDURE — 76512 OPH US DX B-SCAN: CPT | Mod: 50,PBBFAC | Performed by: OPHTHALMOLOGY

## 2018-12-03 PROCEDURE — 92012 INTRM OPH EXAM EST PATIENT: CPT | Mod: S$PBB,,, | Performed by: OPHTHALMOLOGY

## 2018-12-03 PROCEDURE — 99213 OFFICE O/P EST LOW 20 MIN: CPT | Mod: PBBFAC,25 | Performed by: OPHTHALMOLOGY

## 2018-12-03 PROCEDURE — 99999 PR PBB SHADOW E&M-EST. PATIENT-LVL III: CPT | Mod: PBBFAC,,, | Performed by: OPHTHALMOLOGY

## 2018-12-03 PROCEDURE — 92250 FUNDUS PHOTOGRAPHY W/I&R: CPT | Mod: PBBFAC | Performed by: OPHTHALMOLOGY

## 2018-12-03 NOTE — PROGRESS NOTES
HPI     DLS 05/30/18 by Dr. Mitra MD    72 Y/O M here today for his 6 mo Choroidal Nevus OS ck after review of   Fundus Photos and B-Scan. Pt reports; no changes since last visit.          POHx:   S/p left mullerectomy 511/18   Sutures removed 5/18/18           Last edited by Jo Ann Caputo MA on 12/3/2018  9:18 AM. (History)          Bscan:  OS: good quality scan, 1.53 mm elevation isodense choroidal elevation without fluid or excavation, stable (1.42mm 5/30/18, 1.53 mm 2/2018)    Fundus photos   OS: good quality photo, approx 5 mm sl elevated pigmented choroidal lesion with drusen and pigment scar on surface, no OP-stable stable compared to 5/30/18 scan    Assessment /Plan     For exam results, see Encounter Report.    Choroidal nevus, left eye  -     B Scan  -     Color Fundus Photography - OU - Both Eyes    Other orders  -     Color Fundus Photography - OU - Both Eyes  -     B Scan      Nevus stable with obs OS    Discusssed malignant potential and need for f/u    RTC q 1yr sooner PRN

## 2019-03-06 ENCOUNTER — OFFICE VISIT (OUTPATIENT)
Dept: CARDIOLOGY | Facility: CLINIC | Age: 74
End: 2019-03-06
Attending: INTERNAL MEDICINE
Payer: MEDICARE

## 2019-03-06 VITALS
SYSTOLIC BLOOD PRESSURE: 148 MMHG | BODY MASS INDEX: 23.34 KG/M2 | HEIGHT: 68 IN | WEIGHT: 154 LBS | DIASTOLIC BLOOD PRESSURE: 86 MMHG | HEART RATE: 66 BPM

## 2019-03-06 DIAGNOSIS — I10 ESSENTIAL HYPERTENSION: ICD-10-CM

## 2019-03-06 DIAGNOSIS — N18.4 CHRONIC RENAL FAILURE, STAGE 4 (SEVERE): ICD-10-CM

## 2019-03-06 DIAGNOSIS — I77.9 RIGHT-SIDED CAROTID ARTERY DISEASE, UNSPECIFIED TYPE: ICD-10-CM

## 2019-03-06 DIAGNOSIS — R07.9 CHEST PAIN, UNSPECIFIED TYPE: Primary | ICD-10-CM

## 2019-03-06 PROCEDURE — 99214 OFFICE O/P EST MOD 30 MIN: CPT | Mod: S$GLB,,, | Performed by: INTERNAL MEDICINE

## 2019-03-06 PROCEDURE — 99214 PR OFFICE/OUTPT VISIT, EST, LEVL IV, 30-39 MIN: ICD-10-PCS | Mod: S$GLB,,, | Performed by: INTERNAL MEDICINE

## 2019-03-06 NOTE — PROGRESS NOTES
Subjective:    Patient ID:  Derick Cortez is a 73 y.o. male     HPI   Here for follow-up of essential hypertension, stage IV chronic renal failure.  History of previous carotid surgery.    I had on 2 occasions a tightness in my chest 1 time on walking to catch a parade and 1 time at rest.  I have had no exertional chest pain when I walk on the treadmill for 15 min every day.    Current Outpatient Medications   Medication Sig    amlodipine (NORVASC) 10 MG tablet Take 10 mg by mouth once daily.    aspirin (ECOTRIN) 81 MG EC tablet Take 81 mg by mouth once daily.    CRESTOR 20 mg tablet Take 20 mg by mouth every evening.     ergocalciferol (ERGOCALCIFEROL) 50,000 unit Cap     levothyroxine (SYNTHROID) 50 MCG tablet Take 50 mcg by mouth once daily.    metoprolol succinate (TOPROL-XL) 25 MG 24 hr tablet Take 25 mg by mouth every evening.      No current facility-administered medications for this visit.          Review of Systems   Constitution: Negative for chills, decreased appetite, fever, weight gain and weight loss.   HENT: Negative for congestion, hearing loss and sore throat.    Eyes: Negative for blurred vision, double vision and visual disturbance.   Cardiovascular: Positive for chest pain. Negative for claudication, dyspnea on exertion, leg swelling, palpitations and syncope.   Respiratory: Negative for cough, hemoptysis, shortness of breath, sputum production and wheezing.    Endocrine: Negative for cold intolerance and heat intolerance.   Hematologic/Lymphatic: Negative for bleeding problem. Does not bruise/bleed easily.   Skin: Negative for color change, dry skin, flushing and itching.   Musculoskeletal: Negative for back pain, joint pain and myalgias.   Gastrointestinal: Negative for abdominal pain, anorexia, constipation, diarrhea, dysphagia, nausea and vomiting.        No bleeding per rectum   Genitourinary: Negative for dysuria, flank pain, frequency, hematuria and nocturia.   Neurological:  "Negative for dizziness, headaches, light-headedness, loss of balance, seizures and tremors.   Psychiatric/Behavioral: Negative for altered mental status and depression.         Vitals:    03/06/19 1113   BP: (!) 148/86   Pulse: 66   Weight: 69.9 kg (154 lb)   Height: 5' 8" (1.727 m)     Objective:    Physical Exam   Constitutional: He is oriented to person, place, and time. He appears well-developed and well-nourished.   HENT:   Head: Normocephalic and atraumatic.   Right Ear: External ear normal.   Left Ear: External ear normal.   Nose: Nose normal.   Eyes: Conjunctivae and EOM are normal. Pupils are equal, round, and reactive to light. No scleral icterus.   Neck: Normal range of motion. Neck supple. No JVD present. No tracheal deviation present. No thyromegaly present.   Cardiovascular: Normal rate, regular rhythm and normal heart sounds. Exam reveals no gallop and no friction rub.   No murmur heard.  Pulmonary/Chest: Effort normal and breath sounds normal. No respiratory distress. He has no rales. He exhibits no tenderness.   Abdominal: Soft. Bowel sounds are normal. He exhibits no distension and no mass. There is no tenderness.   Musculoskeletal: Normal range of motion. He exhibits no edema or tenderness.   Lymphadenopathy:     He has no cervical adenopathy.   Neurological: He is alert and oriented to person, place, and time. He has normal reflexes. No cranial nerve deficit. Coordination normal.   Skin: Skin is warm and dry. No rash noted.   Psychiatric: He has a normal mood and affect. His behavior is normal.         Assessment:       1. Chest pain, unspecified type    2. Essential hypertension    3. Chronic renal failure, stage 4 (severe)    4. Right-sided carotid artery disease, unspecified type         Plan:       Stress echo  Carotid U/S  Continue present meds.            "

## 2019-04-10 ENCOUNTER — CLINICAL SUPPORT (OUTPATIENT)
Dept: CARDIOLOGY | Facility: CLINIC | Age: 74
End: 2019-04-10
Payer: MEDICARE

## 2019-04-10 DIAGNOSIS — I10 HYPERTENSION: ICD-10-CM

## 2019-04-10 DIAGNOSIS — I77.9 CAROTID ARTERIAL DISEASE: ICD-10-CM

## 2019-04-10 DIAGNOSIS — R07.9 CHEST PAIN, UNSPECIFIED TYPE: ICD-10-CM

## 2019-04-10 DIAGNOSIS — R07.9 CHEST PAIN: ICD-10-CM

## 2019-04-10 PROCEDURE — 93351 STRESS TTE COMPLETE: CPT | Mod: S$GLB,,, | Performed by: INTERNAL MEDICINE

## 2019-04-10 PROCEDURE — 93880 EXTRACRANIAL BILAT STUDY: CPT | Mod: S$GLB,,, | Performed by: INTERNAL MEDICINE

## 2019-04-10 PROCEDURE — 93880 PR DUPLEX SCAN EXTRACRANIAL,BILAT: ICD-10-PCS | Mod: S$GLB,,, | Performed by: INTERNAL MEDICINE

## 2019-04-10 PROCEDURE — 93351 PR ECHO HEART XTHORACIC, STRESS/REST, W CONTIN ECG: ICD-10-PCS | Mod: S$GLB,,, | Performed by: INTERNAL MEDICINE

## 2019-12-05 ENCOUNTER — OFFICE VISIT (OUTPATIENT)
Dept: OPHTHALMOLOGY | Facility: CLINIC | Age: 74
End: 2019-12-05
Payer: MEDICARE

## 2019-12-05 DIAGNOSIS — H25.13 CATARACT, NUCLEAR SCLEROTIC, BOTH EYES: ICD-10-CM

## 2019-12-05 DIAGNOSIS — H35.413 LATTICE DEGENERATION OF BOTH RETINAS: ICD-10-CM

## 2019-12-05 DIAGNOSIS — D31.32 CHOROIDAL NEVUS, LEFT EYE: Primary | ICD-10-CM

## 2019-12-05 DIAGNOSIS — H02.402 ACQUIRED PTOSIS OF EYELID, LEFT: ICD-10-CM

## 2019-12-05 PROCEDURE — 76512 B SCAN: ICD-10-PCS | Mod: 26,S$PBB,LT, | Performed by: OPHTHALMOLOGY

## 2019-12-05 PROCEDURE — 76512 OPH US DX B-SCAN: CPT | Mod: 50,PBBFAC | Performed by: OPHTHALMOLOGY

## 2019-12-05 PROCEDURE — 99999 PR PBB SHADOW E&M-EST. PATIENT-LVL III: CPT | Mod: PBBFAC,,, | Performed by: OPHTHALMOLOGY

## 2019-12-05 PROCEDURE — 99213 OFFICE O/P EST LOW 20 MIN: CPT | Mod: PBBFAC | Performed by: OPHTHALMOLOGY

## 2019-12-05 PROCEDURE — 92014 PR EYE EXAM, EST PATIENT,COMPREHESV: ICD-10-PCS | Mod: S$PBB,,, | Performed by: OPHTHALMOLOGY

## 2019-12-05 PROCEDURE — 92250 FUNDUS PHOTOGRAPHY W/I&R: CPT | Mod: PBBFAC | Performed by: OPHTHALMOLOGY

## 2019-12-05 PROCEDURE — 92014 COMPRE OPH EXAM EST PT 1/>: CPT | Mod: S$PBB,,, | Performed by: OPHTHALMOLOGY

## 2019-12-05 PROCEDURE — 92250 COLOR FUNDUS PHOTOGRAPHY - OU - BOTH EYES: ICD-10-PCS | Mod: 26,S$PBB,, | Performed by: OPHTHALMOLOGY

## 2019-12-05 PROCEDURE — 99999 PR PBB SHADOW E&M-EST. PATIENT-LVL III: ICD-10-PCS | Mod: PBBFAC,,, | Performed by: OPHTHALMOLOGY

## 2019-12-05 RX ORDER — TADALAFIL 20 MG/1
TABLET ORAL
Refills: 6 | COMMUNITY
Start: 2019-11-26 | End: 2023-06-05

## 2019-12-05 NOTE — PROGRESS NOTES
Subjective:       Patient ID: Derick Cortez is a 74 y.o. male      Chief Complaint   Patient presents with    Concerns About Ocular Health     1 yr chk     History of Present Illness  HPI     Concerns About Ocular Health      Additional comments: 1 yr chk              Comments     Patient states that vision seems about the same as last visit in both   eyes.  No pain/f/f OU.  No new distortions      Choroidal nevus, left eye    No gtts          Last edited by Dawit Manriquez MD on 12/5/2019  4:40 PM. (History)        Imaging:      Bscan:  OS: good quality scan, 1.18 mm elevation isodense choroidal elevation without fluid or excavation, stable (1.42mm 5/30/18, 1.53 mm 2/2018, 1.53 12/2018)  Stable    Fundus photos   OS: good quality photo, approx 5 mm sl elevated pigmented choroidal lesion with drusen and pigment scar on surface, no OP-stable stable compared to 12/18 scan    Assessment/Plan:     1. Choroidal nevus, left eye  Stable with obs  Small malignant potential and need for yearly f/u discussed    2. Lattice degeneration of both retinas  Stable  RD precautions discussed in detail, patient expressed understanding  RTC immediately PRN (especially ANY change flashes, floaters, vision, visual field)      3. Acquired ptosis of eyelid, left  S/p surgery with Dr Ibrahim.  Doing well    4. Cataract, nuclear sclerotic, both eyes  Excellent Va.  Observe      Follow up in about 1 year (around 12/5/2020), or if symptoms worsen or fail to improve, for Comprehensive Examination, Fundus Photo, Bscan.

## 2020-02-08 ENCOUNTER — OFFICE VISIT (OUTPATIENT)
Dept: URGENT CARE | Facility: CLINIC | Age: 75
End: 2020-02-08
Payer: MEDICARE

## 2020-02-08 VITALS
TEMPERATURE: 98 F | BODY MASS INDEX: 23.34 KG/M2 | HEIGHT: 68 IN | HEART RATE: 66 BPM | OXYGEN SATURATION: 100 % | WEIGHT: 154 LBS | SYSTOLIC BLOOD PRESSURE: 131 MMHG | DIASTOLIC BLOOD PRESSURE: 86 MMHG | RESPIRATION RATE: 10 BRPM

## 2020-02-08 DIAGNOSIS — M72.2 PLANTAR FASCIITIS OF RIGHT FOOT: Primary | ICD-10-CM

## 2020-02-08 DIAGNOSIS — M25.561 ACUTE PAIN OF RIGHT KNEE: ICD-10-CM

## 2020-02-08 PROCEDURE — 73562 X-RAY EXAM OF KNEE 3: CPT | Mod: FY,RT,S$GLB, | Performed by: RADIOLOGY

## 2020-02-08 PROCEDURE — 99214 PR OFFICE/OUTPT VISIT, EST, LEVL IV, 30-39 MIN: ICD-10-PCS | Mod: S$GLB,,, | Performed by: NURSE PRACTITIONER

## 2020-02-08 PROCEDURE — 73562 XR KNEE 3 VIEW RIGHT: ICD-10-PCS | Mod: FY,RT,S$GLB, | Performed by: RADIOLOGY

## 2020-02-08 PROCEDURE — 99214 OFFICE O/P EST MOD 30 MIN: CPT | Mod: S$GLB,,, | Performed by: NURSE PRACTITIONER

## 2020-02-08 RX ORDER — METHYLPREDNISOLONE 4 MG/1
4 TABLET ORAL DAILY
Qty: 21 TABLET | Refills: 0 | Status: SHIPPED | OUTPATIENT
Start: 2020-02-08 | End: 2020-02-29

## 2020-02-08 NOTE — PATIENT INSTRUCTIONS
Proper fitting shoes at all times  Tennis shoes when exercising.    Please return here or go to the Emergency Department for any concerns or worsening of condition.  If you were prescribed antibiotics, please take them to completion.  If you were prescribed a narcotic medication, do not drive or operate heavy equipment or machinery while taking these medications.  Please follow up with your primary care doctor or specialist as needed.    If you  smoke, please stop smoking.

## 2020-02-08 NOTE — PROGRESS NOTES
"Subjective:       Patient ID: Derick Cortez is a 74 y.o. male.    Vitals:  height is 5' 8" (1.727 m) and weight is 69.9 kg (154 lb). His oral temperature is 98 °F (36.7 °C). His blood pressure is 131/86 and his pulse is 66. His respiration is 10 and oxygen saturation is 100%.     Chief Complaint: Leg Pain (lower right leg)    Pt c/o right lower leg pain with standing and walking; pt states pain started in right knee walks with loafers on when he is exercise and exercises 3 days a week. Pain in his right foot goes away after a few steps. Knee is more on the outside and with bending and straightening.     Leg Pain    The incident occurred 3 to 5 days ago. The incident occurred at home. The injury mechanism is unknown. The pain is present in the right leg. The quality of the pain is described as aching. The pain is at a severity of 6/10. The pain is mild. The pain has been constant since onset. Pertinent negatives include no numbness or tingling. He reports no foreign bodies present. The symptoms are aggravated by weight bearing and movement. He has tried ice and heat for the symptoms. The treatment provided mild relief.       Constitution: Negative for chills, fatigue and fever.   HENT: Negative for congestion and sore throat.    Neck: Negative for painful lymph nodes.   Cardiovascular: Negative for chest pain and leg swelling.   Eyes: Negative for double vision and blurred vision.   Respiratory: Negative for cough and shortness of breath.    Gastrointestinal: Negative for nausea, vomiting and diarrhea.   Genitourinary: Negative for dysuria, frequency and urgency.   Musculoskeletal: Positive for pain (lower right leg ), joint pain and pain with walking. Negative for joint swelling, muscle cramps and muscle ache.   Skin: Negative for color change, pale and rash.   Allergic/Immunologic: Negative for seasonal allergies.   Neurological: Negative for dizziness, history of vertigo, light-headedness, passing out, headaches " and numbness.   Hematologic/Lymphatic: Negative for swollen lymph nodes, easy bruising/bleeding and history of blood clots. Does not bruise/bleed easily.   Psychiatric/Behavioral: Negative for nervous/anxious, sleep disturbance and depression. The patient is not nervous/anxious.        Objective:      Physical Exam   Constitutional: He is oriented to person, place, and time. He appears well-developed and well-nourished. He is cooperative.  Non-toxic appearance. He does not appear ill. No distress.   HENT:   Head: Normocephalic and atraumatic.   Right Ear: Hearing, tympanic membrane, external ear and ear canal normal.   Left Ear: Hearing, tympanic membrane, external ear and ear canal normal.   Nose: Nose normal. No mucosal edema, rhinorrhea or nasal deformity. No epistaxis. Right sinus exhibits no maxillary sinus tenderness and no frontal sinus tenderness. Left sinus exhibits no maxillary sinus tenderness and no frontal sinus tenderness.   Mouth/Throat: Uvula is midline, oropharynx is clear and moist and mucous membranes are normal. No trismus in the jaw. Normal dentition. No uvula swelling. No posterior oropharyngeal erythema.   Eyes: Conjunctivae and lids are normal. Right eye exhibits no discharge. Left eye exhibits no discharge. No scleral icterus.   Neck: Trachea normal, normal range of motion, full passive range of motion without pain and phonation normal. Neck supple.   Cardiovascular: Normal rate, regular rhythm, normal heart sounds, intact distal pulses and normal pulses.   Pulmonary/Chest: Effort normal and breath sounds normal. No respiratory distress.   Abdominal: Soft. Normal appearance and bowel sounds are normal. He exhibits no distension, no pulsatile midline mass and no mass. There is no tenderness.   Musculoskeletal: He exhibits no edema or deformity.        Right knee: He exhibits decreased range of motion. Tenderness found. Lateral joint line tenderness noted.        Right ankle: He exhibits  decreased range of motion and swelling.        Legs:  Neurological: He is alert and oriented to person, place, and time. He exhibits normal muscle tone. Coordination normal.   Skin: Skin is warm, dry, intact, not diaphoretic and not pale.   Psychiatric: He has a normal mood and affect. His speech is normal and behavior is normal. Judgment and thought content normal. Cognition and memory are normal.   Nursing note and vitals reviewed.        Assessment:       1. Plantar fasciitis of right foot    2. Acute pain of right knee        Plan:         Plantar fasciitis of right foot  -     methylPREDNISolone (MEDROL) 4 MG Tab; Take 1 tablet (4 mg total) by mouth once daily. 24 mg (6 pills) PO on day 1, then decrease by 4mg/day x 5 days per dose pack instructions. for 21 doses  Dispense: 21 tablet; Refill: 0  -     Ambulatory referral/consult to Orthopedics    Acute pain of right knee  -     XR KNEE 3 VIEW RIGHT; Future; Expected date: 02/08/2020          Xray interpretation   osteoarthritis noted. No actue fracture.       Proper fitting shoes at all times  Tennis shoes when exercising.    Please return here or go to the Emergency Department for any concerns or worsening of condition.  If you were prescribed antibiotics, please take them to completion.  If you were prescribed a narcotic medication, do not drive or operate heavy equipment or machinery while taking these medications.  Please follow up with your primary care doctor or specialist as needed.    If you  smoke, please stop smoking.

## 2020-12-17 ENCOUNTER — OFFICE VISIT (OUTPATIENT)
Dept: OPHTHALMOLOGY | Facility: CLINIC | Age: 75
End: 2020-12-17
Payer: MEDICARE

## 2020-12-17 DIAGNOSIS — H25.13 CATARACT, NUCLEAR SCLEROTIC, BOTH EYES: ICD-10-CM

## 2020-12-17 DIAGNOSIS — D31.32 CHOROIDAL NEVUS, LEFT EYE: Primary | ICD-10-CM

## 2020-12-17 DIAGNOSIS — H02.402 ACQUIRED PTOSIS OF EYELID, LEFT: ICD-10-CM

## 2020-12-17 DIAGNOSIS — H35.413 LATTICE DEGENERATION OF BOTH RETINAS: ICD-10-CM

## 2020-12-17 PROCEDURE — 99999 PR PBB SHADOW E&M-EST. PATIENT-LVL III: CPT | Mod: PBBFAC,,, | Performed by: OPHTHALMOLOGY

## 2020-12-17 PROCEDURE — 92014 COMPRE OPH EXAM EST PT 1/>: CPT | Mod: S$PBB,,, | Performed by: OPHTHALMOLOGY

## 2020-12-17 PROCEDURE — 76512 OPH US DX B-SCAN: CPT | Mod: 50,PBBFAC | Performed by: OPHTHALMOLOGY

## 2020-12-17 PROCEDURE — 92014 PR EYE EXAM, EST PATIENT,COMPREHESV: ICD-10-PCS | Mod: S$PBB,,, | Performed by: OPHTHALMOLOGY

## 2020-12-17 PROCEDURE — 76512 B SCAN: ICD-10-PCS | Mod: 26,50,S$PBB, | Performed by: OPHTHALMOLOGY

## 2020-12-17 PROCEDURE — 92201 OPSCPY EXTND RTA DRAW UNI/BI: CPT | Mod: PBBFAC | Performed by: OPHTHALMOLOGY

## 2020-12-17 PROCEDURE — 99999 PR PBB SHADOW E&M-EST. PATIENT-LVL III: ICD-10-PCS | Mod: PBBFAC,,, | Performed by: OPHTHALMOLOGY

## 2020-12-17 PROCEDURE — 99213 OFFICE O/P EST LOW 20 MIN: CPT | Mod: PBBFAC,25 | Performed by: OPHTHALMOLOGY

## 2020-12-17 PROCEDURE — 92201 OPSCPY EXTND RTA DRAW UNI/BI: CPT | Mod: S$PBB,,, | Performed by: OPHTHALMOLOGY

## 2020-12-17 PROCEDURE — 92201 PR OPHTHALMOSCOPY, EXT, W/RET DRAW/SCLERAL DEPR, I&R, UNI/BI: ICD-10-PCS | Mod: S$PBB,,, | Performed by: OPHTHALMOLOGY

## 2020-12-17 NOTE — PROGRESS NOTES
Subjective:       Patient ID: Derick Cortez is a 75 y.o. male      Chief Complaint   Patient presents with    Choroidal Nevus left eye     History of Present Illness  HPI     DLS 12/05/2019 by Dr. Mitra MD    74 yo male here today for annual choroidal nevus evaluation as instructed.      Pt reports Va stable, no distortions.  Very rare flash OD.  No floaters   OU.  No pain.    gtts          Ocular HX  S/p left mullerectomy 511/18   Sutures removed 5/18/18           Last edited by Dawit Manriquez MD on 12/17/2020  6:26 PM. (History)        Imaging:      Bscan:  OS: good quality scan, 1.47 mm elevation isodense choroidal elevation without fluid or excavation, stable (1.42mm 5/30/18, 1.53 mm 2/2018, 1.53 12/2018)  Stable    Prior Fundus photos 2019  OS: good quality photo, approx 5 mm sl elevated pigmented choroidal lesion with drusen and pigment scar on surface, no OP-stable stable compared to 12/18 scan    Assessment/Plan:     1. Choroidal nevus, left eye  Stable with obs  Small malignant potential and need for yearly f/u discussed    2. Lattice degeneration of both retinas  Stable  RD precautions discussed in detail, patient expressed understanding  RTC immediately PRN (especially ANY change flashes, floaters, vision, visual field)    3. Acquired ptosis of eyelid, left  S/p surgery with Dr Ibrahim.  Doing well    4. Cataract, nuclear sclerotic, both eyes  Excellent Va.  Observe      Follow up in about 1 year (around 12/17/2021), or if symptoms worsen or fail to improve, for Comprehensive Examination, Bscan, Fundus Photo.

## 2021-01-29 ENCOUNTER — IMMUNIZATION (OUTPATIENT)
Dept: PHARMACY | Facility: CLINIC | Age: 76
End: 2021-01-29
Payer: MEDICARE

## 2021-01-29 DIAGNOSIS — Z23 NEED FOR VACCINATION: Primary | ICD-10-CM

## 2021-02-26 ENCOUNTER — IMMUNIZATION (OUTPATIENT)
Dept: PHARMACY | Facility: CLINIC | Age: 76
End: 2021-02-26
Payer: MEDICARE

## 2021-02-26 DIAGNOSIS — Z23 NEED FOR VACCINATION: Primary | ICD-10-CM

## 2021-03-26 ENCOUNTER — PATIENT MESSAGE (OUTPATIENT)
Dept: RESEARCH | Facility: HOSPITAL | Age: 76
End: 2021-03-26

## 2021-07-19 ENCOUNTER — OFFICE VISIT (OUTPATIENT)
Dept: URGENT CARE | Facility: CLINIC | Age: 76
End: 2021-07-19
Payer: MEDICARE

## 2021-07-19 VITALS
RESPIRATION RATE: 18 BRPM | BODY MASS INDEX: 24.49 KG/M2 | WEIGHT: 147 LBS | HEART RATE: 68 BPM | HEIGHT: 65 IN | OXYGEN SATURATION: 97 % | TEMPERATURE: 99 F | SYSTOLIC BLOOD PRESSURE: 137 MMHG | DIASTOLIC BLOOD PRESSURE: 86 MMHG

## 2021-07-19 DIAGNOSIS — J06.9 VIRAL URI WITH COUGH: Primary | ICD-10-CM

## 2021-07-19 DIAGNOSIS — Z20.822 ENCOUNTER FOR LABORATORY TESTING FOR COVID-19 VIRUS: ICD-10-CM

## 2021-07-19 LAB
CTP QC/QA: YES
SARS-COV-2 RDRP RESP QL NAA+PROBE: NEGATIVE

## 2021-07-19 PROCEDURE — U0002: ICD-10-PCS | Mod: QW,CR,S$GLB, | Performed by: STUDENT IN AN ORGANIZED HEALTH CARE EDUCATION/TRAINING PROGRAM

## 2021-07-19 PROCEDURE — U0002 COVID-19 LAB TEST NON-CDC: HCPCS | Mod: QW,CR,S$GLB, | Performed by: STUDENT IN AN ORGANIZED HEALTH CARE EDUCATION/TRAINING PROGRAM

## 2021-07-19 PROCEDURE — 99213 OFFICE O/P EST LOW 20 MIN: CPT | Mod: S$GLB,,, | Performed by: STUDENT IN AN ORGANIZED HEALTH CARE EDUCATION/TRAINING PROGRAM

## 2021-07-19 PROCEDURE — 99213 PR OFFICE/OUTPT VISIT, EST, LEVL III, 20-29 MIN: ICD-10-PCS | Mod: S$GLB,,, | Performed by: STUDENT IN AN ORGANIZED HEALTH CARE EDUCATION/TRAINING PROGRAM

## 2021-11-27 ENCOUNTER — CLINICAL SUPPORT (OUTPATIENT)
Dept: URGENT CARE | Facility: CLINIC | Age: 76
End: 2021-11-27
Payer: COMMERCIAL

## 2021-11-27 DIAGNOSIS — Z11.52 ENCOUNTER FOR SCREENING FOR COVID-19: Primary | ICD-10-CM

## 2021-11-27 PROCEDURE — 99211 OFF/OP EST MAY X REQ PHY/QHP: CPT | Mod: S$GLB,,, | Performed by: NURSE PRACTITIONER

## 2021-11-27 PROCEDURE — 99211 PR OFFICE/OUTPT VISIT, EST, LEVL I: ICD-10-PCS | Mod: S$GLB,,, | Performed by: NURSE PRACTITIONER

## 2021-12-17 ENCOUNTER — OFFICE VISIT (OUTPATIENT)
Dept: OPHTHALMOLOGY | Facility: CLINIC | Age: 76
End: 2021-12-17
Payer: COMMERCIAL

## 2021-12-17 DIAGNOSIS — H25.13 CATARACT, NUCLEAR SCLEROTIC, BOTH EYES: ICD-10-CM

## 2021-12-17 DIAGNOSIS — D31.32 CHOROIDAL NEVUS, LEFT EYE: Primary | ICD-10-CM

## 2021-12-17 DIAGNOSIS — H35.413 LATTICE DEGENERATION OF BOTH RETINAS: ICD-10-CM

## 2021-12-17 DIAGNOSIS — H02.402 ACQUIRED PTOSIS OF EYELID, LEFT: ICD-10-CM

## 2021-12-17 PROCEDURE — 99999 PR PBB SHADOW E&M-EST. PATIENT-LVL III: ICD-10-PCS | Mod: PBBFAC,,, | Performed by: OPHTHALMOLOGY

## 2021-12-17 PROCEDURE — 76512 B SCAN: ICD-10-PCS | Mod: 50,S$GLB,, | Performed by: OPHTHALMOLOGY

## 2021-12-17 PROCEDURE — 92201 OPSCPY EXTND RTA DRAW UNI/BI: CPT | Mod: S$GLB,,, | Performed by: OPHTHALMOLOGY

## 2021-12-17 PROCEDURE — 92014 COMPRE OPH EXAM EST PT 1/>: CPT | Mod: S$GLB,,, | Performed by: OPHTHALMOLOGY

## 2021-12-17 PROCEDURE — 99999 PR PBB SHADOW E&M-EST. PATIENT-LVL III: CPT | Mod: PBBFAC,,, | Performed by: OPHTHALMOLOGY

## 2021-12-17 PROCEDURE — 76512 OPH US DX B-SCAN: CPT | Mod: 50,S$GLB,, | Performed by: OPHTHALMOLOGY

## 2021-12-17 PROCEDURE — 92014 PR EYE EXAM, EST PATIENT,COMPREHESV: ICD-10-PCS | Mod: S$GLB,,, | Performed by: OPHTHALMOLOGY

## 2021-12-17 PROCEDURE — 92201 PR OPHTHALMOSCOPY, EXT, W/RET DRAW/SCLERAL DEPR, I&R, UNI/BI: ICD-10-PCS | Mod: S$GLB,,, | Performed by: OPHTHALMOLOGY

## 2022-12-19 ENCOUNTER — OFFICE VISIT (OUTPATIENT)
Dept: OPHTHALMOLOGY | Facility: CLINIC | Age: 77
End: 2022-12-19
Payer: COMMERCIAL

## 2022-12-19 DIAGNOSIS — H02.402 ACQUIRED PTOSIS OF EYELID, LEFT: ICD-10-CM

## 2022-12-19 DIAGNOSIS — D31.32 CHOROIDAL NEVUS, LEFT EYE: Primary | ICD-10-CM

## 2022-12-19 DIAGNOSIS — H25.13 CATARACT, NUCLEAR SCLEROTIC, BOTH EYES: ICD-10-CM

## 2022-12-19 DIAGNOSIS — H35.413 LATTICE DEGENERATION OF BOTH RETINAS: ICD-10-CM

## 2022-12-19 PROCEDURE — 1160F PR REVIEW ALL MEDS BY PRESCRIBER/CLIN PHARMACIST DOCUMENTED: ICD-10-PCS | Mod: CPTII,S$GLB,, | Performed by: OPHTHALMOLOGY

## 2022-12-19 PROCEDURE — 92014 COMPRE OPH EXAM EST PT 1/>: CPT | Mod: S$GLB,,, | Performed by: OPHTHALMOLOGY

## 2022-12-19 PROCEDURE — 1160F RVW MEDS BY RX/DR IN RCRD: CPT | Mod: CPTII,S$GLB,, | Performed by: OPHTHALMOLOGY

## 2022-12-19 PROCEDURE — 1126F PR PAIN SEVERITY QUANTIFIED, NO PAIN PRESENT: ICD-10-PCS | Mod: CPTII,S$GLB,, | Performed by: OPHTHALMOLOGY

## 2022-12-19 PROCEDURE — 92250 COLOR FUNDUS PHOTOGRAPHY - OU - BOTH EYES: ICD-10-PCS | Mod: S$GLB,,, | Performed by: OPHTHALMOLOGY

## 2022-12-19 PROCEDURE — 99999 PR PBB SHADOW E&M-EST. PATIENT-LVL III: CPT | Mod: PBBFAC,,, | Performed by: OPHTHALMOLOGY

## 2022-12-19 PROCEDURE — 3288F FALL RISK ASSESSMENT DOCD: CPT | Mod: CPTII,S$GLB,, | Performed by: OPHTHALMOLOGY

## 2022-12-19 PROCEDURE — 99999 PR PBB SHADOW E&M-EST. PATIENT-LVL III: ICD-10-PCS | Mod: PBBFAC,,, | Performed by: OPHTHALMOLOGY

## 2022-12-19 PROCEDURE — 1101F PR PT FALLS ASSESS DOC 0-1 FALLS W/OUT INJ PAST YR: ICD-10-PCS | Mod: CPTII,S$GLB,, | Performed by: OPHTHALMOLOGY

## 2022-12-19 PROCEDURE — 1159F PR MEDICATION LIST DOCUMENTED IN MEDICAL RECORD: ICD-10-PCS | Mod: CPTII,S$GLB,, | Performed by: OPHTHALMOLOGY

## 2022-12-19 PROCEDURE — 3288F PR FALLS RISK ASSESSMENT DOCUMENTED: ICD-10-PCS | Mod: CPTII,S$GLB,, | Performed by: OPHTHALMOLOGY

## 2022-12-19 PROCEDURE — 1126F AMNT PAIN NOTED NONE PRSNT: CPT | Mod: CPTII,S$GLB,, | Performed by: OPHTHALMOLOGY

## 2022-12-19 PROCEDURE — 1159F MED LIST DOCD IN RCRD: CPT | Mod: CPTII,S$GLB,, | Performed by: OPHTHALMOLOGY

## 2022-12-19 PROCEDURE — 92250 FUNDUS PHOTOGRAPHY W/I&R: CPT | Mod: S$GLB,,, | Performed by: OPHTHALMOLOGY

## 2022-12-19 PROCEDURE — 92014 PR EYE EXAM, EST PATIENT,COMPREHESV: ICD-10-PCS | Mod: S$GLB,,, | Performed by: OPHTHALMOLOGY

## 2022-12-19 PROCEDURE — 1101F PT FALLS ASSESS-DOCD LE1/YR: CPT | Mod: CPTII,S$GLB,, | Performed by: OPHTHALMOLOGY

## 2022-12-19 NOTE — PROGRESS NOTES
Subjective:       Patient ID: Derick Cortez is a 77 y.o. male      Chief Complaint   Patient presents with    Follow-up     MARLY mead as instructed     History of Present Illness  HPI     Follow-up     Additional comments: MARLY mead as instructed           Comments    DLS 12/17/21 by Dr. Mitra MD    Va good OU.  No f/f/pain OU      Ocular HX  S/p left mullerectomy 511/18   Choroidal nevus                Last edited by Dawit Manriquez MD on 12/19/2022  5:46 PM.        Imaging:      Bscan:  OS: good quality scan, inf sl elevated lesion, too thin to tell internal reflectivity.  Appears stable compared to prior Bscans.      Prior Fundus photos 2019  OS: good quality photo, approx 5 mm sl elevated pigmented choroidal lesion with drusen and pigment scar on surface, no OP-stable stable compared to 12/18 scan    Assessment/Plan:     1. Choroidal nevus, left eye  Stable on exam compared to fundus photos and testing  Small malignant potential and need for yearly f/u discussed    2. Lattice degeneration of both retinas  Stable  RD precautions discussed in detail, patient expressed understanding  RTC immediately PRN (especially ANY change flashes, floaters, vision, visual field)    3. Acquired ptosis of eyelid, left  S/p surgery with Dr Ibrahim.  Doing well    4. Cataract, nuclear sclerotic, both eyes  Excellent Va.  Observe      Follow up in about 1 year (around 12/19/2023), or if symptoms worsen or fail to improve, for Fundus Photo, Bscan.

## 2023-04-17 ENCOUNTER — PATIENT MESSAGE (OUTPATIENT)
Dept: NEPHROLOGY | Facility: CLINIC | Age: 78
End: 2023-04-17
Payer: MEDICARE

## 2023-04-17 ENCOUNTER — TELEPHONE (OUTPATIENT)
Dept: NEPHROLOGY | Facility: CLINIC | Age: 78
End: 2023-04-17

## 2023-05-29 ENCOUNTER — PATIENT MESSAGE (OUTPATIENT)
Dept: NEPHROLOGY | Facility: CLINIC | Age: 78
End: 2023-05-29
Payer: MEDICARE

## 2023-06-05 ENCOUNTER — OFFICE VISIT (OUTPATIENT)
Dept: NEPHROLOGY | Facility: CLINIC | Age: 78
End: 2023-06-05
Payer: MEDICARE

## 2023-06-05 VITALS
HEIGHT: 65 IN | HEART RATE: 60 BPM | SYSTOLIC BLOOD PRESSURE: 112 MMHG | DIASTOLIC BLOOD PRESSURE: 60 MMHG | BODY MASS INDEX: 24.46 KG/M2

## 2023-06-05 DIAGNOSIS — N26.1 ATROPHIC KIDNEY: ICD-10-CM

## 2023-06-05 DIAGNOSIS — N18.32 STAGE 3B CHRONIC KIDNEY DISEASE: Primary | ICD-10-CM

## 2023-06-05 PROCEDURE — 1159F PR MEDICATION LIST DOCUMENTED IN MEDICAL RECORD: ICD-10-PCS | Mod: CPTII,S$GLB,, | Performed by: INTERNAL MEDICINE

## 2023-06-05 PROCEDURE — 1160F RVW MEDS BY RX/DR IN RCRD: CPT | Mod: CPTII,S$GLB,, | Performed by: INTERNAL MEDICINE

## 2023-06-05 PROCEDURE — 99204 OFFICE O/P NEW MOD 45 MIN: CPT | Mod: S$GLB,,, | Performed by: INTERNAL MEDICINE

## 2023-06-05 PROCEDURE — 3288F PR FALLS RISK ASSESSMENT DOCUMENTED: ICD-10-PCS | Mod: CPTII,S$GLB,, | Performed by: INTERNAL MEDICINE

## 2023-06-05 PROCEDURE — 3074F SYST BP LT 130 MM HG: CPT | Mod: CPTII,S$GLB,, | Performed by: INTERNAL MEDICINE

## 2023-06-05 PROCEDURE — 1159F MED LIST DOCD IN RCRD: CPT | Mod: CPTII,S$GLB,, | Performed by: INTERNAL MEDICINE

## 2023-06-05 PROCEDURE — 3078F PR MOST RECENT DIASTOLIC BLOOD PRESSURE < 80 MM HG: ICD-10-PCS | Mod: CPTII,S$GLB,, | Performed by: INTERNAL MEDICINE

## 2023-06-05 PROCEDURE — 1160F PR REVIEW ALL MEDS BY PRESCRIBER/CLIN PHARMACIST DOCUMENTED: ICD-10-PCS | Mod: CPTII,S$GLB,, | Performed by: INTERNAL MEDICINE

## 2023-06-05 PROCEDURE — 3078F DIAST BP <80 MM HG: CPT | Mod: CPTII,S$GLB,, | Performed by: INTERNAL MEDICINE

## 2023-06-05 PROCEDURE — 3288F FALL RISK ASSESSMENT DOCD: CPT | Mod: CPTII,S$GLB,, | Performed by: INTERNAL MEDICINE

## 2023-06-05 PROCEDURE — 1101F PT FALLS ASSESS-DOCD LE1/YR: CPT | Mod: CPTII,S$GLB,, | Performed by: INTERNAL MEDICINE

## 2023-06-05 PROCEDURE — 1101F PR PT FALLS ASSESS DOC 0-1 FALLS W/OUT INJ PAST YR: ICD-10-PCS | Mod: CPTII,S$GLB,, | Performed by: INTERNAL MEDICINE

## 2023-06-05 PROCEDURE — 99999 PR PBB SHADOW E&M-EST. PATIENT-LVL III: CPT | Mod: PBBFAC,,, | Performed by: INTERNAL MEDICINE

## 2023-06-05 PROCEDURE — 3074F PR MOST RECENT SYSTOLIC BLOOD PRESSURE < 130 MM HG: ICD-10-PCS | Mod: CPTII,S$GLB,, | Performed by: INTERNAL MEDICINE

## 2023-06-05 PROCEDURE — 99999 PR PBB SHADOW E&M-EST. PATIENT-LVL III: ICD-10-PCS | Mod: PBBFAC,,, | Performed by: INTERNAL MEDICINE

## 2023-06-05 PROCEDURE — 99204 PR OFFICE/OUTPT VISIT, NEW, LEVL IV, 45-59 MIN: ICD-10-PCS | Mod: S$GLB,,, | Performed by: INTERNAL MEDICINE

## 2023-06-05 RX ORDER — FOLIC ACID 0.4 MG
1 TABLET ORAL DAILY
COMMUNITY

## 2023-06-05 RX ORDER — FLUTICASONE PROPIONATE 50 MCG
SPRAY, SUSPENSION (ML) NASAL
COMMUNITY
Start: 2022-12-09

## 2023-06-05 RX ORDER — UBIDECARENONE 75 MG
CAPSULE ORAL
COMMUNITY

## 2023-06-05 RX ORDER — TADALAFIL 5 MG/1
5 TABLET ORAL DAILY
COMMUNITY
Start: 2022-12-11

## 2023-06-05 RX ORDER — CALCIUM CARBONATE 200(500)MG
TABLET,CHEWABLE ORAL
COMMUNITY

## 2023-06-05 NOTE — PROGRESS NOTES
Subjective:       Patient ID: Derick Cortez is a 77 y.o. White male who presents for new patient evaluation for chronic renal failure.    Derick Cortez is referred by Clay Alvarenga MD to be evaluated for chronic renal failure.      This patient was seeing Dr. Vincent prior to his alf .  He states he has an atrophic kidney and has been eating a very low protein diet for some time now.  He has prominent fatigue and has lost some weight.  His only protein intake is usually two eggs in the morning.  He has no uremic or urinary symptoms and is in his usual state of health.  There have been no recent illnesses, hospitalizations or procedures.  He denies NSAIDs.  His baseline Scr back to 2019 is 1.6-1.8.  He found out he had an atrophic kidney 30 years ago.      Review of Systems   Constitutional:  Positive for fatigue and unexpected weight change. Negative for appetite change, chills and fever.   HENT:  Positive for hearing loss. Negative for congestion.    Eyes:  Negative for visual disturbance.   Respiratory:  Negative for cough and shortness of breath.    Cardiovascular:  Negative for chest pain and leg swelling.   Gastrointestinal:  Negative for abdominal pain, diarrhea, nausea and vomiting.   Genitourinary:  Negative for difficulty urinating, dysuria and hematuria.   Musculoskeletal:  Positive for arthralgias (knees) and myalgias.   Skin:  Negative for rash.   Neurological:  Negative for headaches.   Psychiatric/Behavioral:  Negative for sleep disturbance.        The past medical, family and social histories were reviewed for this encounter.     Past Medical History:   Diagnosis Date    Hypertension     Kidney dysfunction     one functioning kidney cause unknown per pt.     History reviewed. No pertinent surgical history.  Social History     Socioeconomic History    Marital status:    Tobacco Use    Smoking status: Never    Smokeless tobacco: Never   Substance and Sexual Activity    Alcohol use:  "Yes     Current Outpatient Medications   Medication Sig    amlodipine (NORVASC) 10 MG tablet Take 10 mg by mouth once daily.    aspirin (ECOTRIN) 81 MG EC tablet Take 81 mg by mouth once daily.    calcium carbonate (TUMS) 200 mg calcium (500 mg) chewable tablet Take 1 tablet by mouth three times a week, on Monday, Wednesday and Friday. (Pt taking formula with Calcium carbonate 200 mg and Calcium 500 mg.)    CRESTOR 20 mg tablet Take 20 mg by mouth every evening.     cyanocobalamin 500 MCG tablet Take 1 tablet by mouth three times a week, on Monday, Wednesday, and Friday.    ergocalciferol (ERGOCALCIFEROL) 50,000 unit Cap     fluticasone propionate (FLONASE) 50 mcg/actuation nasal spray by Each Nostril route.    folic acid (FOLVITE) 400 MCG tablet Take 1 tablet by mouth once daily.    levothyroxine (SYNTHROID) 50 MCG tablet Take 50 mcg by mouth once daily.    metoprolol succinate (TOPROL-XL) 25 MG 24 hr tablet Take 25 mg by mouth every evening.     tadalafiL (CIALIS) 5 MG tablet 5 mg once daily.     No current facility-administered medications for this visit.       /60 (BP Location: Left arm, Patient Position: Sitting, BP Method: Medium (Manual))   Pulse 60   Ht 5' 5" (1.651 m)   BMI 24.46 kg/m²     Objective:      Physical Exam  Vitals reviewed.   Constitutional:       General: He is not in acute distress.     Appearance: He is well-developed.   HENT:      Head: Normocephalic and atraumatic.   Eyes:      General: No scleral icterus.     Conjunctiva/sclera: Conjunctivae normal.   Neck:      Vascular: No JVD.   Cardiovascular:      Rate and Rhythm: Normal rate and regular rhythm.      Heart sounds: Normal heart sounds. No murmur heard.    No friction rub. No gallop.   Pulmonary:      Effort: Pulmonary effort is normal. No respiratory distress.      Breath sounds: Normal breath sounds. No wheezing or rales.   Abdominal:      General: Bowel sounds are normal. There is no distension.      Palpations: Abdomen is " soft.      Tenderness: There is no abdominal tenderness.   Musculoskeletal:      Cervical back: Normal range of motion.      Right lower leg: No edema.      Left lower leg: No edema.   Skin:     General: Skin is warm and dry.      Findings: No rash.   Neurological:      Mental Status: He is alert and oriented to person, place, and time.   Psychiatric:         Mood and Affect: Mood normal.         Behavior: Behavior normal.       Assessment:       1. Stage 3b chronic kidney disease    2. Atrophic kidney        Plan:   Return to clinic in 6 months.  Labs for next visit include rp pth us.  RP ua upc in 1 month.  Labs at [facility] per his request.  Baseline creatinine is 1.6-1.8 at least as far back as 2019..  PTH is 77 with a calcium of 9.4.  We discussed diet.  His goal protein intake is 60-70 grams a day.  He seems to have table function.  I will get new labs as well as a renal US to confirm his atrophic kidney size as well as look at the health of the good kidney.  We discussed his mediations and supplements.

## 2023-07-05 LAB
ALBUMIN: 4.7 G/DL (ref 3.5–5)
BUN BLD-MCNC: 26 MG/DL (ref 7–21)
CALCIUM SERPL-MCNC: 9.5 MG/DL (ref 8.5–10.3)
CHLORIDE SERPL-SCNC: 104 MMOL/L (ref 98–107)
CO2 SERPL-SCNC: 23 MMOL/L (ref 21–31)
CREAT SERPL-MCNC: 1.62 MG/DL (ref 0.7–1.2)
EGFR: 43 ML/MIN/1.73M2
GLUCOSE SERPL-MCNC: 90 MG/DL (ref 70–100)
PHOSPHATE FLD-MCNC: 3.1 MG/DL (ref 2.4–4.4)
POTASSIUM SERPL-SCNC: 5.1 MMOL/L (ref 3.5–5)
SODIUM BLD-SCNC: 139 MMOL/L (ref 135–145)

## 2023-09-06 ENCOUNTER — TELEPHONE (OUTPATIENT)
Dept: NEPHROLOGY | Facility: CLINIC | Age: 78
End: 2023-09-06
Payer: MEDICARE

## 2023-09-06 NOTE — TELEPHONE ENCOUNTER
----- Message from Anuradha Guerra MA sent at 9/6/2023  4:46 PM CDT -----  Type: Needs Medical Advice  Who Called:  pt    Best Call Back Number: [phone]    Additional Information: please advise pt calling about lab orders that are not in he states he need to know if they are fasting or non fasting but none are showing please reach out to pt to discuss this matter

## 2023-09-20 ENCOUNTER — TELEPHONE (OUTPATIENT)
Dept: NEPHROLOGY | Facility: CLINIC | Age: 78
End: 2023-09-20
Payer: MEDICARE

## 2023-09-20 NOTE — TELEPHONE ENCOUNTER
----- Message from Flor Bowman sent at 9/20/2023 11:22 AM CDT -----  Contact: Self  Type:  Test Results    Who Called:  Pt   Name of Test (Lab/Mammo/Etc):  Labs  Date of Test:  A couple weeks ago  Ordering Provider:  Neo   Where the test was performed:  [facility]  Best Call Back Number:  [phone]    Additional Information:

## 2023-09-20 NOTE — TELEPHONE ENCOUNTER
The patient is looking for results from [employer].     They gave him a 24 hour urine container.       Advised I didn't see where a 24 hour urine was ordered , but they did a UPC on it.     I also do not see a RFP.     Advised will look into this and call him back.  I will log into [facility] tomorrow to see what I can find out.

## 2023-11-28 ENCOUNTER — TELEPHONE (OUTPATIENT)
Dept: NEPHROLOGY | Facility: CLINIC | Age: 78
End: 2023-11-28
Payer: MEDICARE

## 2023-11-28 NOTE — TELEPHONE ENCOUNTER
----- Message from Meghan Hayden sent at 11/28/2023  1:49 PM CST -----  Regarding: blood test  Contact: pt  Type:  Needs Medical Advice    Who Called: pt  Would the patient rather a call back or a response via [facility]? Call back  Best Call Back Number: [phone]  Additional Information: sts he would like to speak to the nurse regarding a blood test

## 2023-12-02 LAB
ALBUMIN SERPL-MCNC: 4.4 G/DL (ref 3.6–5.1)
BUN SERPL-MCNC: 36 MG/DL (ref 7–25)
BUN/CREAT SERPL: 18 (CALC) (ref 6–22)
CALCIUM SERPL-MCNC: 9.6 MG/DL (ref 8.6–10.3)
CHLORIDE SERPL-SCNC: 107 MMOL/L (ref 98–110)
CO2 SERPL-SCNC: 22 MMOL/L (ref 20–32)
CREAT SERPL-MCNC: 1.98 MG/DL (ref 0.7–1.28)
CREAT UR-MCNC: 40 MG/DL (ref 20–320)
EGFR: 34 ML/MIN/1.73M2
GLUCOSE SERPL-MCNC: 92 MG/DL (ref 65–99)
PHOSPHATE SERPL-MCNC: 4.2 MG/DL (ref 2.1–4.3)
POTASSIUM SERPL-SCNC: 4.9 MMOL/L (ref 3.5–5.3)
PROT UR-MCNC: 6 MG/DL (ref 5–25)
PROT/CREAT UR: 0.15 MG/MG CREAT (ref 0.03–0.15)
PROT/CREAT UR: 150 MG/G CREAT (ref 25–148)
PTH-INTACT SERPL-MCNC: 69 PG/ML (ref 16–77)
SODIUM SERPL-SCNC: 139 MMOL/L (ref 135–146)

## 2023-12-05 ENCOUNTER — HOSPITAL ENCOUNTER (OUTPATIENT)
Dept: RADIOLOGY | Facility: HOSPITAL | Age: 78
Discharge: HOME | End: 2023-12-05
Attending: INTERNAL MEDICINE
Payer: MEDICARE

## 2023-12-05 DIAGNOSIS — N18.32 STAGE 3B CHRONIC KIDNEY DISEASE: ICD-10-CM

## 2023-12-05 PROCEDURE — 76770 US RETROPERITONEAL COMPLETE: ICD-10-PCS | Mod: 26,,, | Performed by: RADIOLOGY

## 2023-12-05 PROCEDURE — 76770 US EXAM ABDO BACK WALL COMP: CPT | Mod: TC

## 2023-12-05 PROCEDURE — 76770 US EXAM ABDO BACK WALL COMP: CPT | Mod: 26,,, | Performed by: RADIOLOGY

## 2023-12-11 ENCOUNTER — OFFICE VISIT (OUTPATIENT)
Dept: NEPHROLOGY | Facility: CLINIC | Age: 78
End: 2023-12-11
Payer: MEDICARE

## 2023-12-11 VITALS
HEIGHT: 65 IN | DIASTOLIC BLOOD PRESSURE: 68 MMHG | BODY MASS INDEX: 24.5 KG/M2 | OXYGEN SATURATION: 99 % | SYSTOLIC BLOOD PRESSURE: 128 MMHG | HEART RATE: 57 BPM | WEIGHT: 147.06 LBS

## 2023-12-11 DIAGNOSIS — N18.32 STAGE 3B CHRONIC KIDNEY DISEASE: Primary | ICD-10-CM

## 2023-12-11 DIAGNOSIS — N26.1 ATROPHIC KIDNEY: ICD-10-CM

## 2023-12-11 DIAGNOSIS — N20.0 CALCULUS OF KIDNEY: ICD-10-CM

## 2023-12-11 DIAGNOSIS — N28.1 KIDNEY CYST, ACQUIRED: ICD-10-CM

## 2023-12-11 DIAGNOSIS — I10 PRIMARY HYPERTENSION: ICD-10-CM

## 2023-12-11 PROCEDURE — 99999 PR PBB SHADOW E&M-EST. PATIENT-LVL III: CPT | Mod: PBBFAC,,, | Performed by: INTERNAL MEDICINE

## 2023-12-11 PROCEDURE — 3078F DIAST BP <80 MM HG: CPT | Mod: CPTII,S$GLB,, | Performed by: INTERNAL MEDICINE

## 2023-12-11 PROCEDURE — 1160F RVW MEDS BY RX/DR IN RCRD: CPT | Mod: CPTII,S$GLB,, | Performed by: INTERNAL MEDICINE

## 2023-12-11 PROCEDURE — 1101F PT FALLS ASSESS-DOCD LE1/YR: CPT | Mod: CPTII,S$GLB,, | Performed by: INTERNAL MEDICINE

## 2023-12-11 PROCEDURE — 3074F SYST BP LT 130 MM HG: CPT | Mod: CPTII,S$GLB,, | Performed by: INTERNAL MEDICINE

## 2023-12-11 PROCEDURE — 1159F MED LIST DOCD IN RCRD: CPT | Mod: CPTII,S$GLB,, | Performed by: INTERNAL MEDICINE

## 2023-12-11 PROCEDURE — 99214 PR OFFICE/OUTPT VISIT, EST, LEVL IV, 30-39 MIN: ICD-10-PCS | Mod: S$GLB,,, | Performed by: INTERNAL MEDICINE

## 2023-12-11 PROCEDURE — 1160F PR REVIEW ALL MEDS BY PRESCRIBER/CLIN PHARMACIST DOCUMENTED: ICD-10-PCS | Mod: CPTII,S$GLB,, | Performed by: INTERNAL MEDICINE

## 2023-12-11 PROCEDURE — 99999 PR PBB SHADOW E&M-EST. PATIENT-LVL III: ICD-10-PCS | Mod: PBBFAC,,, | Performed by: INTERNAL MEDICINE

## 2023-12-11 PROCEDURE — 1159F PR MEDICATION LIST DOCUMENTED IN MEDICAL RECORD: ICD-10-PCS | Mod: CPTII,S$GLB,, | Performed by: INTERNAL MEDICINE

## 2023-12-11 PROCEDURE — 3078F PR MOST RECENT DIASTOLIC BLOOD PRESSURE < 80 MM HG: ICD-10-PCS | Mod: CPTII,S$GLB,, | Performed by: INTERNAL MEDICINE

## 2023-12-11 PROCEDURE — 3288F PR FALLS RISK ASSESSMENT DOCUMENTED: ICD-10-PCS | Mod: CPTII,S$GLB,, | Performed by: INTERNAL MEDICINE

## 2023-12-11 PROCEDURE — 1101F PR PT FALLS ASSESS DOC 0-1 FALLS W/OUT INJ PAST YR: ICD-10-PCS | Mod: CPTII,S$GLB,, | Performed by: INTERNAL MEDICINE

## 2023-12-11 PROCEDURE — 3074F PR MOST RECENT SYSTOLIC BLOOD PRESSURE < 130 MM HG: ICD-10-PCS | Mod: CPTII,S$GLB,, | Performed by: INTERNAL MEDICINE

## 2023-12-11 PROCEDURE — 3288F FALL RISK ASSESSMENT DOCD: CPT | Mod: CPTII,S$GLB,, | Performed by: INTERNAL MEDICINE

## 2023-12-11 PROCEDURE — 99214 OFFICE O/P EST MOD 30 MIN: CPT | Mod: S$GLB,,, | Performed by: INTERNAL MEDICINE

## 2023-12-11 NOTE — Clinical Note
Hi.  I put a referral in but see what you think of this toby.  Last line in my note plan is the sofi notes version.

## 2023-12-11 NOTE — PROGRESS NOTES
"    Subjective:       Patient ID: Derick Cortez is a 78 y.o. White male who presents for return patient evaluation for chronic renal failure.      He states he has an atrophic kidney and has been eating a very low protein diet for some time now.  He has prominent fatigue and has lost some weight.  His only protein intake is usually two eggs in the morning.  He has no uremic or urinary symptoms and is in his usual state of health.  There have been no recent illnesses, hospitalizations or procedures.  He denies NSAIDs.  His baseline Scr back to 2019 is 1.6-1.8.  He found out he had an atrophic kidney 30 years ago.  He is kin to Bertin Chamorro.      Review of Systems   Constitutional:  Positive for fatigue and unexpected weight change. Negative for appetite change, chills and fever.   HENT:  Positive for hearing loss. Negative for congestion.    Eyes:  Negative for visual disturbance.   Respiratory:  Negative for cough and shortness of breath.    Cardiovascular:  Negative for chest pain and leg swelling.   Gastrointestinal:  Negative for abdominal pain, diarrhea, nausea and vomiting.   Genitourinary:  Negative for difficulty urinating, dysuria and hematuria.   Musculoskeletal:  Positive for arthralgias (knees) and myalgias.   Skin:  Negative for rash.   Neurological:  Negative for headaches.   Psychiatric/Behavioral:  Negative for sleep disturbance.        The past medical, family and social histories were reviewed for this encounter.     Ht 5' 5" (1.651 m)   Wt 66.7 kg (147 lb 0.8 oz)   BMI 24.47 kg/m²     Objective:      Physical Exam  Vitals reviewed.   Constitutional:       General: He is not in acute distress.     Appearance: He is well-developed.   HENT:      Head: Normocephalic and atraumatic.   Eyes:      General: No scleral icterus.     Conjunctiva/sclera: Conjunctivae normal.   Neck:      Vascular: No JVD.   Cardiovascular:      Rate and Rhythm: Normal rate and regular rhythm.      Heart sounds: Normal " "heart sounds. No murmur heard.     No friction rub. No gallop.   Pulmonary:      Effort: Pulmonary effort is normal. No respiratory distress.      Breath sounds: Normal breath sounds. No wheezing or rales.   Abdominal:      General: Bowel sounds are normal. There is no distension.      Palpations: Abdomen is soft.      Tenderness: There is no abdominal tenderness.   Musculoskeletal:      Cervical back: Normal range of motion.      Right lower leg: No edema.      Left lower leg: No edema.   Skin:     General: Skin is warm and dry.      Findings: No rash.   Neurological:      Mental Status: He is alert and oriented to person, place, and time.   Psychiatric:         Mood and Affect: Mood normal.         Behavior: Behavior normal.         Assessment:       1. Stage 3b chronic kidney disease    2. Atrophic kidney        Lab Results   Component Value Date    CREATININE 1.98 (H) 12/01/2023    BUN 36 (H) 12/01/2023     12/01/2023    K 4.9 12/01/2023     12/01/2023    CO2 22 12/01/2023     Lab Results   Component Value Date    PTH 69 12/01/2023    CALCIUM 9.6 12/01/2023    PHOS 3.1 07/05/2023     No results found for: "HCT"  No results found for: "UTPCR"  Plan:   Return to clinic in 6 months.  Labs for next visit include rp pth us.  RP ua upc in 1 month.  Labs at [facility] per his request.  Baseline creatinine is 1.6-1.9 at least as far back as 2019.  PTH is 69 with a calcium of 9.6.  Renal US shows R 10.9 cm L 9.9 cm.  We discussed diet.  His goal protein intake is 60-70 grams a day.  We discussed his mediations and supplements.  His kidney function is stable.  However the problem I have is that his l kidney does not appear to be atrophic.  It is likely chronically obstructed and thus I would not think trying to get it open would result in a significant improvement in his function.  However, if is has been obstructed for a while, I would expect his kidney size to be small.  I will ask th opinion of my Urologic " colleague per the patient's request.

## 2023-12-12 ENCOUNTER — TELEPHONE (OUTPATIENT)
Dept: NEPHROLOGY | Facility: CLINIC | Age: 78
End: 2023-12-12
Payer: MEDICARE

## 2023-12-12 NOTE — TELEPHONE ENCOUNTER
----- Message from Lashanda Cantu sent at 12/12/2023 11:46 AM CST -----  Regarding: advice  Contact: patient  Type: Needs Medical Advice  Who Called:  patient  Symptoms (please be specific):    How long has patient had these symptoms:    Pharmacy name and phone #:    Best Call Back Number: [phone]  Additional Information: Patient states he received his discharge paperwork and it is for a different patient.  Please call patient to advise.  Thanks!

## 2023-12-18 ENCOUNTER — OFFICE VISIT (OUTPATIENT)
Dept: UROLOGY | Facility: CLINIC | Age: 78
End: 2023-12-18
Payer: MEDICARE

## 2023-12-18 ENCOUNTER — TELEPHONE (OUTPATIENT)
Dept: UROLOGY | Facility: CLINIC | Age: 78
End: 2023-12-18
Payer: MEDICARE

## 2023-12-18 VITALS — HEIGHT: 65 IN | BODY MASS INDEX: 25.85 KG/M2 | WEIGHT: 155.19 LBS

## 2023-12-18 DIAGNOSIS — N20.0 CALCULUS OF KIDNEY: ICD-10-CM

## 2023-12-18 DIAGNOSIS — N28.1 KIDNEY CYST, ACQUIRED: ICD-10-CM

## 2023-12-18 DIAGNOSIS — N26.1 ATROPHIC KIDNEY: ICD-10-CM

## 2023-12-18 DIAGNOSIS — N13.30 HYDRONEPHROSIS OF LEFT KIDNEY: ICD-10-CM

## 2023-12-18 DIAGNOSIS — N20.0 KIDNEY STONES: Primary | ICD-10-CM

## 2023-12-18 LAB
BILIRUBIN, UA POC OHS: NEGATIVE
BLOOD, UA POC OHS: ABNORMAL
CLARITY, UA POC OHS: CLEAR
COLOR, UA POC OHS: YELLOW
GLUCOSE, UA POC OHS: NEGATIVE
KETONES, UA POC OHS: NEGATIVE
LEUKOCYTES, UA POC OHS: NEGATIVE
NITRITE, UA POC OHS: NEGATIVE
PH, UA POC OHS: 5.5
PROTEIN, UA POC OHS: NEGATIVE
SPECIFIC GRAVITY, UA POC OHS: 1.02
UROBILINOGEN, UA POC OHS: 0.2

## 2023-12-18 PROCEDURE — 99999 PR PBB SHADOW E&M-EST. PATIENT-LVL III: CPT | Mod: PBBFAC,,,

## 2023-12-18 PROCEDURE — 1159F PR MEDICATION LIST DOCUMENTED IN MEDICAL RECORD: ICD-10-PCS | Mod: CPTII,S$GLB,,

## 2023-12-18 PROCEDURE — 1160F RVW MEDS BY RX/DR IN RCRD: CPT | Mod: CPTII,S$GLB,,

## 2023-12-18 PROCEDURE — 3288F FALL RISK ASSESSMENT DOCD: CPT | Mod: CPTII,S$GLB,,

## 2023-12-18 PROCEDURE — 1101F PT FALLS ASSESS-DOCD LE1/YR: CPT | Mod: CPTII,S$GLB,,

## 2023-12-18 PROCEDURE — 99999 PR PBB SHADOW E&M-EST. PATIENT-LVL III: ICD-10-PCS | Mod: PBBFAC,,,

## 2023-12-18 PROCEDURE — 99204 PR OFFICE/OUTPT VISIT, NEW, LEVL IV, 45-59 MIN: ICD-10-PCS | Mod: S$GLB,,,

## 2023-12-18 PROCEDURE — 1159F MED LIST DOCD IN RCRD: CPT | Mod: CPTII,S$GLB,,

## 2023-12-18 PROCEDURE — 3288F PR FALLS RISK ASSESSMENT DOCUMENTED: ICD-10-PCS | Mod: CPTII,S$GLB,,

## 2023-12-18 PROCEDURE — 1126F AMNT PAIN NOTED NONE PRSNT: CPT | Mod: CPTII,S$GLB,,

## 2023-12-18 PROCEDURE — 81003 POCT URINALYSIS(INSTRUMENT): ICD-10-PCS | Mod: QW,S$GLB,,

## 2023-12-18 PROCEDURE — 81003 URINALYSIS AUTO W/O SCOPE: CPT | Mod: QW,S$GLB,,

## 2023-12-18 PROCEDURE — 1101F PR PT FALLS ASSESS DOC 0-1 FALLS W/OUT INJ PAST YR: ICD-10-PCS | Mod: CPTII,S$GLB,,

## 2023-12-18 PROCEDURE — 1126F PR PAIN SEVERITY QUANTIFIED, NO PAIN PRESENT: ICD-10-PCS | Mod: CPTII,S$GLB,,

## 2023-12-18 PROCEDURE — 1160F PR REVIEW ALL MEDS BY PRESCRIBER/CLIN PHARMACIST DOCUMENTED: ICD-10-PCS | Mod: CPTII,S$GLB,,

## 2023-12-18 PROCEDURE — 99204 OFFICE O/P NEW MOD 45 MIN: CPT | Mod: S$GLB,,,

## 2023-12-18 NOTE — PROGRESS NOTES
"[facility] Note  Staff: LINDA Lopez    PCP: MD Lyle    Chief Complaint: Hydronephrosis    Subjective:        HPI: Derick Cortez is a 78 y.o. male NEW PATIENT presents today for evaluation of hydronephrosis. He is accompanied by his wife. He states Dr. Larson sent him over for further review and recs. He states this is not a new problem for him. He was seen in the past by Dr. Araujo for the same. He brought a copy of a NM renal scan done in 2013. These images are not available for review. The report states "atrophic hydronephrotic poorly functioning left kidney, with no appreciable response to lasix. Minimally diminished right renal function, but with no evidence for hydronephrosis".     He had a recent retroperitoneal US on 12/5/2023 which showed Right kidney: The right kidney measures 10.9 cm.  Cortical thinning. There is loss of corticomedullary distinction. Resistive index measures 0.75.  Simple cysts measuring 4.1 x 3.8 x 3.9 cm, 2.2 x 1.6 x 2.8 cm, 1.0 x 1.0 x 0.9 cm.  No mass. No renal stone. No hydronephrosis.     Left kidney: The left kidney measures 9.9 cm.  Cortical thinning. There is loss of corticomedullary distinction. Resistive index measures 0.77.  No mass. 7 mm and 2 mm nonobstructing stones.  Simple cyst in the inferior pole measuring 4.2 x 3.8 x 3.2 cm.  Moderate hydronephrosis.     The bladder is distended at the time of scanning and has an unremarkable appearance.    I reviewed all imaging with him today. He denies dysuria, hematuria, fever, flank pain, abd pain, urgency, frequency, incontinence, and difficulty urinating. His most recent eGFR from 12/1/2023 was 34. He denies a history of kidney stones.     Questions asked the pt during ov today:  Dysuria: No  Gross Hematuria:No  Straining:No, Hesistancy:No, Intermittency:No}, Weak stream:No    Last PSA Screening: No results found for: "PSA", "PSADIAG"    History of Kidney Stones?:  No    Constipation " issues?:  No    REVIEW OF SYSTEMS:  Review of Systems   Constitutional: Negative.  Negative for chills and fever.   HENT: Negative.     Eyes: Negative.    Respiratory: Negative.     Cardiovascular: Negative.    Gastrointestinal: Negative.  Negative for abdominal pain, constipation, diarrhea, nausea and vomiting.   Genitourinary: Negative.  Negative for dysuria, flank pain, frequency, hematuria and urgency.   Musculoskeletal: Negative.  Negative for back pain.   Skin: Negative.    Neurological: Negative.    Endo/Heme/Allergies: Negative.    Psychiatric/Behavioral: Negative.         PMHx:  Past Medical History:   Diagnosis Date    Hypertension     Kidney dysfunction     one functioning kidney cause unknown per pt.       PSHx:  History reviewed. No pertinent surgical history.    Fam Hx:   malignancies: No    kidney stones: No     Soc Hx:  , lives in [place]    Allergies:  Pcn [penicillins]    Medications: reviewed     Objective:   There were no vitals filed for this visit.    Physical Exam  Constitutional:       Appearance: Normal appearance.   HENT:      Head: Normocephalic.      Mouth/Throat:      Mouth: Mucous membranes are moist.   Eyes:      Conjunctiva/sclera: Conjunctivae normal.   Pulmonary:      Effort: Pulmonary effort is normal.   Abdominal:      General: There is no distension.      Palpations: Abdomen is soft.      Tenderness: There is no abdominal tenderness. There is no right CVA tenderness or left CVA tenderness.   Musculoskeletal:         General: Normal range of motion.      Cervical back: Normal range of motion.   Skin:     General: Skin is warm.   Neurological:      Mental Status: He is alert and oriented to person, place, and time.   Psychiatric:         Mood and Affect: Mood normal.         Behavior: Behavior normal.           LABS REVIEW:  UA today:  Color:Clear, Yellow  Spec. Grav.  1.020  PH  5.5  Negative for leukocytes, nitrates, protein, glucose, ketones, urobili, bili  Trace  blood    Assessment:       1. Kidney stones    2. Hydronephrosis of left kidney    3. Atrophic kidney    4. Kidney cyst, acquired    5. Calculus of kidney          Plan:     Update NM renal scan- ordered and scheduled    F/u As Needed    [facility]: Active    Di Ruiz, [id number]

## 2023-12-18 NOTE — TELEPHONE ENCOUNTER
Record request sent to [facility] in [place] , spoke with Anahi , she advised patient only seen once in 2013 and once in 2014. Celina stated patient records had been shredded.

## 2023-12-27 ENCOUNTER — HOSPITAL ENCOUNTER (OUTPATIENT)
Dept: RADIOLOGY | Facility: HOSPITAL | Age: 78
Discharge: HOME | End: 2023-12-27
Payer: MEDICARE

## 2023-12-27 DIAGNOSIS — N20.0 KIDNEY STONES: ICD-10-CM

## 2023-12-27 DIAGNOSIS — N13.30 HYDRONEPHROSIS OF LEFT KIDNEY: ICD-10-CM

## 2023-12-27 PROCEDURE — A9562 TC99M MERTIATIDE: HCPCS | Mod: PO

## 2023-12-27 PROCEDURE — 78708 K FLOW/FUNCT IMAGE W/DRUG: CPT | Mod: 26,,, | Performed by: RADIOLOGY

## 2023-12-28 ENCOUNTER — PATIENT MESSAGE (OUTPATIENT)
Dept: UROLOGY | Facility: CLINIC | Age: 78
End: 2023-12-28
Payer: MEDICARE

## 2023-12-28 DIAGNOSIS — N13.30 HYDRONEPHROSIS, UNSPECIFIED HYDRONEPHROSIS TYPE: Primary | ICD-10-CM

## 2023-12-29 ENCOUNTER — OFFICE VISIT (OUTPATIENT)
Dept: OPHTHALMOLOGY | Facility: CLINIC | Age: 78
End: 2023-12-29
Payer: MEDICARE

## 2023-12-29 ENCOUNTER — TELEPHONE (OUTPATIENT)
Dept: UROLOGY | Facility: CLINIC | Age: 78
End: 2023-12-29
Payer: MEDICARE

## 2023-12-29 DIAGNOSIS — H25.13 CATARACT, NUCLEAR SCLEROTIC, BOTH EYES: ICD-10-CM

## 2023-12-29 DIAGNOSIS — H35.413 LATTICE DEGENERATION OF BOTH RETINAS: ICD-10-CM

## 2023-12-29 DIAGNOSIS — D31.32 CHOROIDAL NEVUS, LEFT EYE: Primary | ICD-10-CM

## 2023-12-29 PROCEDURE — 92250 FUNDUS PHOTOGRAPHY W/I&R: CPT | Mod: S$GLB,,, | Performed by: OPHTHALMOLOGY

## 2023-12-29 PROCEDURE — 1159F MED LIST DOCD IN RCRD: CPT | Mod: CPTII,S$GLB,, | Performed by: OPHTHALMOLOGY

## 2023-12-29 PROCEDURE — 99999 PR PBB SHADOW E&M-EST. PATIENT-LVL III: CPT | Mod: PBBFAC,,, | Performed by: OPHTHALMOLOGY

## 2023-12-29 PROCEDURE — 76512 OPH US DX B-SCAN: CPT | Mod: 50,S$GLB,, | Performed by: OPHTHALMOLOGY

## 2023-12-29 PROCEDURE — 1101F PT FALLS ASSESS-DOCD LE1/YR: CPT | Mod: CPTII,S$GLB,, | Performed by: OPHTHALMOLOGY

## 2023-12-29 PROCEDURE — 92014 COMPRE OPH EXAM EST PT 1/>: CPT | Mod: S$GLB,,, | Performed by: OPHTHALMOLOGY

## 2023-12-29 PROCEDURE — 1160F RVW MEDS BY RX/DR IN RCRD: CPT | Mod: CPTII,S$GLB,, | Performed by: OPHTHALMOLOGY

## 2023-12-29 PROCEDURE — 3288F FALL RISK ASSESSMENT DOCD: CPT | Mod: CPTII,S$GLB,, | Performed by: OPHTHALMOLOGY

## 2023-12-29 NOTE — TELEPHONE ENCOUNTER
----- Message from Di Ruiz NP sent at 12/28/2023  1:32 PM CST -----  This pt needs to be scheduled for CT RSS and have repeat labs done (renal function panel). Dr. Escobedo said he will see him after labs and imaging to review with him- thank you

## 2023-12-29 NOTE — PROGRESS NOTES
Subjective:       Patient ID: Derick Cortez is a 78 y.o. male      Chief Complaint   Patient presents with    YEARLY EXAM OU    DFE  AND B SCAN      History of Present Illness  HPI    DLS 12/17/21 by Dr. Mitra MD    Va stable OU   Va good OU.  No f/f/pain OU      Ocular HX  S/p left mullerectomy 5/11/18   Choroidal nevus        Last edited by Dawit Manriquez MD on 1/1/2024 10:00 AM.        Imaging:    See reports      Assessment/Plan:     1. Choroidal nevus, left eye  Stable on exam compared to fundus photos and testing  Small malignant potential and need for yearly f/u discussed    2. Lattice degeneration of both retinas  Stable  RD precautions discussed in detail, patient expressed understanding  RTC immediately PRN (especially ANY change flashes, floaters, vision, visual field)    3. Acquired ptosis of eyelid, left  S/p surgery with Dr Ibrahim.  Doing well    4. Cataract, nuclear sclerotic, both eyes  Excellent Va.  Observe      Follow up in about 1 year (around 12/29/2024), or if symptoms worsen or fail to improve, for Dilated examination, Fundus Photo, Bscan OS.

## 2024-01-02 ENCOUNTER — HOSPITAL ENCOUNTER (OUTPATIENT)
Dept: RADIOLOGY | Facility: HOSPITAL | Age: 79
Discharge: HOME | End: 2024-01-02
Payer: MEDICARE

## 2024-01-02 ENCOUNTER — TELEPHONE (OUTPATIENT)
Dept: NEPHROLOGY | Facility: CLINIC | Age: 79
End: 2024-01-02
Payer: MEDICARE

## 2024-01-02 DIAGNOSIS — N13.30 HYDRONEPHROSIS, UNSPECIFIED HYDRONEPHROSIS TYPE: ICD-10-CM

## 2024-01-02 PROCEDURE — 74176 CT ABD & PELVIS W/O CONTRAST: CPT | Mod: TC

## 2024-01-02 PROCEDURE — 74176 CT ABD & PELVIS W/O CONTRAST: CPT | Mod: 26,,, | Performed by: RADIOLOGY

## 2024-01-02 NOTE — TELEPHONE ENCOUNTER
----- Message from Josefina Brown sent at 1/2/2024  9:38 AM CST -----  Regarding: Needs return call asap  Type: Needs Medical Advice  Who Called:  Derick    Joesph Call Back Number: [phone]    Additional Information: Pt is supposed to be getting some testing done today, he doesn't know if she should take the testing bc he is concerned about the dye in the test, please call to evelyn campbell

## 2024-01-28 NOTE — PROGRESS NOTES
"[facility]   Clinic Note    Subjective:     Chief Complaint: Follow-up (CT and Labs Completed )      History of Present Illness:  Derick Cortez is a 78 y.o. male who presents to clinic for evaluation and management of hydronephrosis. He is established to our clinic but new to me    He follows with Dr. Larson for renal function and was recently sent for further urologic evaluation. He was many years ago by Dr. Araujo for the same issue. NM renal scan done in 2013. These images are not available for review. The report states "atrophic hydronephrotic poorly functioning left kidney, with no appreciable response to lasix. Minimally diminished right renal function, but with no evidence for hydronephrosis". Differential renal function was 26% left, and 74% right.      He underwent NM renal scan 12/2023 and CT RSS 1/2/2024. Imaging independently reviewed and interpreted. The CT showed left hydroureteronephrosis to the level of the bladder with severe atrophy of the left kidney. There is a proteinaceous / hemorrhagic cyst on the left as well. 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 Bladder distended. Sclerotic subcentimeter foci in the spine of unclear significance   NM renal scan showed uptake of 34% on the left and 65% on the right. Diuretic T 1/2 is 9.38 on the right and unmeasurable on the left.     He has not had infections. Renal function has been slowly worsening but stable most recently with GFR of 35.4. Creatinine reviewed below. Creatinine was 2019 was 1.6-1.8.     He does not report significant LUTS. IPSS is 2/1. He is on daily cialis. His PVR today however is 574 mL.     Past medical, family, surgical and social history reviewed as documented in chart with pertinent positive medical, family, surgical and social history detailed in HPI.    A review systems was conducted with pertinent positive and negative findings documented in HPI.    Objective:     Estimated body mass index is " "26.93 kg/m² as calculated from the following:    Height as of this encounter: 5' 5" (1.651 m).    Weight as of this encounter: 73.4 kg (161 lb 13.1 oz).    Vital Signs (Most Recent)       Physical Exam  Constitutional:       General: He is not in acute distress.     Appearance: He is not ill-appearing or toxic-appearing.   Pulmonary:      Effort: Pulmonary effort is normal. No accessory muscle usage or respiratory distress.   Neurological:      Mental Status: He is alert.         Labs reviewed below:  Lab Results   Component Value Date    BUN 29 (H) 01/02/2024    CREATININE 1.9 (H) 01/02/2024    ALBUMIN 4.2 01/02/2024      Urine dipstick today was negative for blood, leukocytes, and nitrites.     Assessment:     1. Hydronephrosis of left kidney    2. BPH without urinary obstruction    3. Incomplete bladder emptying      Plan:     Extensive review of imaging. 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 The function of the kidney is likely less than the most recent scan showed. He has no pain and no infections. His renal function is relatively stable and has been chronic. I do not think intervention is warranted for the hydroureteronephrosis.    Elevated PVR but minimal LUTS. I recommend starting flomax  Follow up in 3 months with a Uroflow and PVR. Consider cystoscopy and UDS if it remains significantly elevated    Fletcher Escobedo MD     Total time today for this encounter was 40 minutes. This includes preparing to see the patient (eg, review of tests), obtaining and/or reviewing separately obtained history, documenting clinical information in the electronic or other health record, independently interpreting results and communicating results to the patient/family/caregiver, and discussing the plan with other providers when necessary.   "

## 2024-01-29 ENCOUNTER — OFFICE VISIT (OUTPATIENT)
Dept: UROLOGY | Facility: CLINIC | Age: 79
End: 2024-01-29
Payer: MEDICARE

## 2024-01-29 VITALS — HEIGHT: 65 IN | WEIGHT: 161.81 LBS | BODY MASS INDEX: 26.96 KG/M2

## 2024-01-29 DIAGNOSIS — N13.30 HYDRONEPHROSIS OF LEFT KIDNEY: Primary | ICD-10-CM

## 2024-01-29 DIAGNOSIS — N40.0 BPH WITHOUT URINARY OBSTRUCTION: ICD-10-CM

## 2024-01-29 DIAGNOSIS — R33.9 INCOMPLETE BLADDER EMPTYING: ICD-10-CM

## 2024-01-29 LAB
BILIRUBIN, UA POC OHS: NEGATIVE
BLOOD, UA POC OHS: NEGATIVE
CLARITY, UA POC OHS: CLEAR
COLOR, UA POC OHS: YELLOW
GLUCOSE, UA POC OHS: NEGATIVE
KETONES, UA POC OHS: NEGATIVE
LEUKOCYTES, UA POC OHS: NEGATIVE
NITRITE, UA POC OHS: NEGATIVE
PH, UA POC OHS: 5.5
POC RESIDUAL URINE VOLUME: 574 ML (ref 0–100)
PROTEIN, UA POC OHS: NEGATIVE
SPECIFIC GRAVITY, UA POC OHS: 1.01
UROBILINOGEN, UA POC OHS: 0.2

## 2024-01-29 PROCEDURE — 81003 URINALYSIS AUTO W/O SCOPE: CPT | Mod: QW,S$GLB,, | Performed by: STUDENT IN AN ORGANIZED HEALTH CARE EDUCATION/TRAINING PROGRAM

## 2024-01-29 PROCEDURE — 51798 US URINE CAPACITY MEASURE: CPT | Mod: S$GLB,,, | Performed by: STUDENT IN AN ORGANIZED HEALTH CARE EDUCATION/TRAINING PROGRAM

## 2024-01-29 PROCEDURE — 1159F MED LIST DOCD IN RCRD: CPT | Mod: CPTII,S$GLB,, | Performed by: STUDENT IN AN ORGANIZED HEALTH CARE EDUCATION/TRAINING PROGRAM

## 2024-01-29 PROCEDURE — 99215 OFFICE O/P EST HI 40 MIN: CPT | Mod: S$GLB,,, | Performed by: STUDENT IN AN ORGANIZED HEALTH CARE EDUCATION/TRAINING PROGRAM

## 2024-01-29 PROCEDURE — 1160F RVW MEDS BY RX/DR IN RCRD: CPT | Mod: CPTII,S$GLB,, | Performed by: STUDENT IN AN ORGANIZED HEALTH CARE EDUCATION/TRAINING PROGRAM

## 2024-01-29 PROCEDURE — 3288F FALL RISK ASSESSMENT DOCD: CPT | Mod: CPTII,S$GLB,, | Performed by: STUDENT IN AN ORGANIZED HEALTH CARE EDUCATION/TRAINING PROGRAM

## 2024-01-29 PROCEDURE — 99999 PR PBB SHADOW E&M-EST. PATIENT-LVL III: CPT | Mod: PBBFAC,,, | Performed by: STUDENT IN AN ORGANIZED HEALTH CARE EDUCATION/TRAINING PROGRAM

## 2024-01-29 PROCEDURE — 1126F AMNT PAIN NOTED NONE PRSNT: CPT | Mod: CPTII,S$GLB,, | Performed by: STUDENT IN AN ORGANIZED HEALTH CARE EDUCATION/TRAINING PROGRAM

## 2024-01-29 PROCEDURE — 1101F PT FALLS ASSESS-DOCD LE1/YR: CPT | Mod: CPTII,S$GLB,, | Performed by: STUDENT IN AN ORGANIZED HEALTH CARE EDUCATION/TRAINING PROGRAM

## 2024-01-29 RX ORDER — TAMSULOSIN HYDROCHLORIDE 0.4 MG/1
0.4 CAPSULE ORAL NIGHTLY
Qty: 30 CAPSULE | Refills: 11 | Status: SHIPPED | OUTPATIENT
Start: 2024-01-29 | End: 2024-04-29

## 2024-03-11 ENCOUNTER — LAB VISIT (OUTPATIENT)
Dept: LAB | Facility: HOSPITAL | Age: 79
End: 2024-03-11
Attending: INTERNAL MEDICINE
Payer: MEDICARE

## 2024-03-11 DIAGNOSIS — N18.32 STAGE 3B CHRONIC KIDNEY DISEASE: ICD-10-CM

## 2024-03-11 LAB
ALBUMIN SERPL BCP-MCNC: 4 G/DL (ref 3.5–5.2)
ANION GAP SERPL CALC-SCNC: 10 MMOL/L (ref 8–16)
BUN SERPL-MCNC: 25 MG/DL (ref 8–23)
CALCIUM SERPL-MCNC: 9.4 MG/DL (ref 8.7–10.5)
CHLORIDE SERPL-SCNC: 108 MMOL/L (ref 95–110)
CO2 SERPL-SCNC: 22 MMOL/L (ref 23–29)
CREAT SERPL-MCNC: 1.7 MG/DL (ref 0.5–1.4)
CREAT UR-MCNC: 43 MG/DL (ref 23–375)
EST. GFR  (NO RACE VARIABLE): 40.5 ML/MIN/1.73 M^2
GLUCOSE SERPL-MCNC: 89 MG/DL (ref 70–110)
PHOSPHATE SERPL-MCNC: 2.9 MG/DL (ref 2.7–4.5)
POTASSIUM SERPL-SCNC: 4.5 MMOL/L (ref 3.5–5.1)
PROT UR-MCNC: 8 MG/DL (ref 0–15)
PROT/CREAT UR: 0.19 MG/G{CREAT} (ref 0–0.2)
PTH-INTACT SERPL-MCNC: 96.9 PG/ML (ref 9–77)
SODIUM SERPL-SCNC: 140 MMOL/L (ref 136–145)

## 2024-03-11 PROCEDURE — 36415 COLL VENOUS BLD VENIPUNCTURE: CPT | Performed by: INTERNAL MEDICINE

## 2024-03-11 PROCEDURE — 80069 RENAL FUNCTION PANEL: CPT | Performed by: INTERNAL MEDICINE

## 2024-03-11 PROCEDURE — 84156 ASSAY OF PROTEIN URINE: CPT | Performed by: INTERNAL MEDICINE

## 2024-03-11 PROCEDURE — 83970 ASSAY OF PARATHORMONE: CPT | Performed by: INTERNAL MEDICINE

## 2024-04-29 ENCOUNTER — TELEPHONE (OUTPATIENT)
Dept: UROLOGY | Facility: CLINIC | Age: 79
End: 2024-04-29

## 2024-04-29 ENCOUNTER — OFFICE VISIT (OUTPATIENT)
Dept: UROLOGY | Facility: CLINIC | Age: 79
End: 2024-04-29
Payer: MEDICARE

## 2024-04-29 VITALS — HEIGHT: 65 IN | WEIGHT: 163.38 LBS | BODY MASS INDEX: 27.22 KG/M2

## 2024-04-29 DIAGNOSIS — R33.9 INCOMPLETE BLADDER EMPTYING: ICD-10-CM

## 2024-04-29 DIAGNOSIS — N40.0 BPH WITHOUT URINARY OBSTRUCTION: Primary | ICD-10-CM

## 2024-04-29 DIAGNOSIS — N13.30 HYDRONEPHROSIS OF LEFT KIDNEY: ICD-10-CM

## 2024-04-29 LAB
BILIRUBIN, UA POC OHS: NEGATIVE
BLOOD, UA POC OHS: NEGATIVE
CLARITY, UA POC OHS: CLEAR
COLOR, UA POC OHS: YELLOW
GLUCOSE, UA POC OHS: NEGATIVE
KETONES, UA POC OHS: NEGATIVE
LEUKOCYTES, UA POC OHS: NEGATIVE
NITRITE, UA POC OHS: NEGATIVE
PH, UA POC OHS: 5.5
POC RESIDUAL URINE VOLUME: 599 ML (ref 0–100)
PROTEIN, UA POC OHS: NEGATIVE
SPECIFIC GRAVITY, UA POC OHS: 1.01
UROBILINOGEN, UA POC OHS: 0.2

## 2024-04-29 PROCEDURE — 81003 URINALYSIS AUTO W/O SCOPE: CPT | Mod: QW,S$GLB,, | Performed by: STUDENT IN AN ORGANIZED HEALTH CARE EDUCATION/TRAINING PROGRAM

## 2024-04-29 PROCEDURE — 51798 US URINE CAPACITY MEASURE: CPT | Mod: S$GLB,,, | Performed by: STUDENT IN AN ORGANIZED HEALTH CARE EDUCATION/TRAINING PROGRAM

## 2024-04-29 PROCEDURE — 99999 PR PBB SHADOW E&M-EST. PATIENT-LVL III: CPT | Mod: PBBFAC,,, | Performed by: STUDENT IN AN ORGANIZED HEALTH CARE EDUCATION/TRAINING PROGRAM

## 2024-04-29 PROCEDURE — 1159F MED LIST DOCD IN RCRD: CPT | Mod: CPTII,S$GLB,, | Performed by: STUDENT IN AN ORGANIZED HEALTH CARE EDUCATION/TRAINING PROGRAM

## 2024-04-29 PROCEDURE — 3288F FALL RISK ASSESSMENT DOCD: CPT | Mod: CPTII,S$GLB,, | Performed by: STUDENT IN AN ORGANIZED HEALTH CARE EDUCATION/TRAINING PROGRAM

## 2024-04-29 PROCEDURE — 1101F PT FALLS ASSESS-DOCD LE1/YR: CPT | Mod: CPTII,S$GLB,, | Performed by: STUDENT IN AN ORGANIZED HEALTH CARE EDUCATION/TRAINING PROGRAM

## 2024-04-29 PROCEDURE — 1160F RVW MEDS BY RX/DR IN RCRD: CPT | Mod: CPTII,S$GLB,, | Performed by: STUDENT IN AN ORGANIZED HEALTH CARE EDUCATION/TRAINING PROGRAM

## 2024-04-29 PROCEDURE — 99214 OFFICE O/P EST MOD 30 MIN: CPT | Mod: S$GLB,,, | Performed by: STUDENT IN AN ORGANIZED HEALTH CARE EDUCATION/TRAINING PROGRAM

## 2024-04-29 PROCEDURE — 1126F AMNT PAIN NOTED NONE PRSNT: CPT | Mod: CPTII,S$GLB,, | Performed by: STUDENT IN AN ORGANIZED HEALTH CARE EDUCATION/TRAINING PROGRAM

## 2024-04-29 RX ORDER — ALFUZOSIN HYDROCHLORIDE 10 MG/1
10 TABLET, EXTENDED RELEASE ORAL
Qty: 30 TABLET | Refills: 11 | Status: SHIPPED | OUTPATIENT
Start: 2024-04-29 | End: 2024-05-20 | Stop reason: SDUPTHER

## 2024-04-29 RX ORDER — ASPIRIN 81 MG/1
TABLET ORAL
COMMUNITY

## 2024-04-29 RX ORDER — CALCIUM CARBONATE 400(1000)
TABLET,CHEWABLE ORAL
COMMUNITY

## 2024-04-29 NOTE — TELEPHONE ENCOUNTER
----- Message from Teresita Richmond sent at 4/29/2024 10:36 AM CDT -----  Regarding: Pharm auth  Contact: [facility]  Type:  Pharmacy Calling to Clarify an RX    Name of Caller:[facility]    Pharmacy Name:  [facility] ([place]) - [place] - [address]  [address]  [place] [address]  Phone: [phone] Fax: [phone]    Prescription Name:   alfuzosin (UROXATRAL) 10 mg Tb24         What do they need to clarify?: duplicate therapy with another drug filled elsewhere on 4/16.     Best Call Back Number:[phone]     Additional Information: insurance is not allowing them to fill it.   Please advise.  Thank you.

## 2024-04-29 NOTE — PROGRESS NOTES
"[facility]   Clinic Note    Subjective:     Chief Complaint: Follow-up      History of Present Illness:  Derick Cortez is a 78 y.o. male who presents to clinic for evaluation and management of hydronephrosis. He is established to our clinic but new to me    Incomplete bladder emptying:  He does not report significant LUTS. IPSS is 2/1. He is on daily cialis. His PVR last visit 574 mL. PVR today it 599 mL. He was started on flomax and noted improvement in his stream but reports retrograde ejaculation. Estimated prostate volume from CT is 32 cc.     Left hydronephrosis:  He has been followed for left hydronephrosis for many years dating back to at least NM renal scan done in 2013 - "atrophic hydronephrotic poorly functioning left kidney, with no appreciable response to lasix. Minimally diminished right renal function, but with no evidence for hydronephrosis". Differential renal function was 26% left, and 74% right.      He underwent NM renal scan 12/2023 and CT RSS 1/2/2024. Imaging independently reviewed and interpreted. The CT showed left hydroureteronephrosis to the level of the bladder with severe atrophy of the left kidney. There is a proteinaceous / hemorrhagic cyst on the left as well. 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 Bladder distended. Sclerotic subcentimeter foci in the spine of unclear significance   NM renal scan showed uptake of 34% on the left and 65% on the right. Diuretic T 1/2 is 9.38 on the right and unmeasurable on the left.     He has not had infections. Renal function has been slowly worsening but stable most recently with GFR of 35.4. Creatinine reviewed below. Creatinine was 2019 was 1.6-1.8.     Past medical, family, surgical and social history reviewed as documented in chart with pertinent positive medical, family, surgical and social history detailed in HPI.    A review systems was conducted with pertinent positive and negative findings documented in " "HPI.    Objective:     Estimated body mass index is 27.18 kg/m² as calculated from the following:    Height as of this encounter: 5' 5" (1.651 m).    Weight as of this encounter: 74.1 kg (163 lb 5.8 oz).    Vital Signs (Most Recent)       Physical Exam  Constitutional:       General: He is not in acute distress.     Appearance: He is not ill-appearing or toxic-appearing.   Pulmonary:      Effort: Pulmonary effort is normal. No accessory muscle usage or respiratory distress.   Neurological:      Mental Status: He is alert.         Labs reviewed below:  Lab Results   Component Value Date    BUN 25 (H) 03/11/2024    CREATININE 1.7 (H) 03/11/2024    ALBUMIN 4.0 03/11/2024      Urine dipstick today was negative for blood, leukocytes, and nitrites.     Assessment:     1. BPH without urinary obstruction    2. Incomplete bladder emptying    3. Hydronephrosis of left kidney      Plan:     Elevated PVR but minimal LUTS. Switch to uroxatral.   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 The function of the kidney is likely less than the most recent scan showed. He has no pain and no infections. His renal function is relatively stable and has been chronic. Continue observation    Follow up in 1 year    Fletcher Escobedo MD     Total time today for this encounter was 40 minutes. This includes preparing to see the patient (eg, review of tests), obtaining and/or reviewing separately obtained history, documenting clinical information in the electronic or other health record, independently interpreting results and communicating results to the patient/family/caregiver, and discussing the plan with other providers when necessary.   "

## 2024-05-20 DIAGNOSIS — N40.0 BPH WITHOUT URINARY OBSTRUCTION: ICD-10-CM

## 2024-05-20 RX ORDER — ALFUZOSIN HYDROCHLORIDE 10 MG/1
10 TABLET, EXTENDED RELEASE ORAL
Qty: 30 TABLET | Refills: 11 | Status: SHIPPED | OUTPATIENT
Start: 2024-05-20 | End: 2024-05-24 | Stop reason: SDUPTHER

## 2024-05-24 ENCOUNTER — TELEPHONE (OUTPATIENT)
Dept: UROLOGY | Facility: CLINIC | Age: 79
End: 2024-05-24
Payer: MEDICARE

## 2024-05-24 DIAGNOSIS — N40.0 BPH WITHOUT URINARY OBSTRUCTION: ICD-10-CM

## 2024-05-24 RX ORDER — ALFUZOSIN HYDROCHLORIDE 10 MG/1
10 TABLET, EXTENDED RELEASE ORAL
Qty: 30 TABLET | Refills: 11 | Status: SHIPPED | OUTPATIENT
Start: 2024-05-24 | End: 2025-05-24

## 2024-05-24 NOTE — TELEPHONE ENCOUNTER
----- Message from Ashley Vallejo MA sent at 5/24/2024  2:19 PM CDT -----  Regarding: FW: Refill Request    ----- Message -----  From: Anna Marie Meraz  Sent: 5/24/2024   2:17 PM CDT  To: Gregg Bynum Staff  Subject: Refill Request                                   Type:  RX Refill Request    Who Called: Pt  Refill or New Rx:Refill    RX Name and Strength:alfuzosin (UROXATRAL) 10 mg Tb24   How is the patient currently taking it? (ex. 1XDay):1xday  Is this a 30 day or 90 day RX:30    Preferred Pharmacy with phone number:  [facility] - [place], [place] - [address]  [address] [facility] [address]  Phone: [phone] Fax: [phone]        Local or Mail Order: Mail  Ordering Provider: Gregg     Would the patient rather a call back or a response via [facility]? Call Back    Best Call Back Number:[phone]      Additional Information: Needs to transfer the prescription to the Pharmacy above. Sts that he sees he has 11 refills but its at his old pharmacy.    Have a great day. Thank you!

## 2024-06-11 ENCOUNTER — LAB VISIT (OUTPATIENT)
Dept: LAB | Facility: HOSPITAL | Age: 79
End: 2024-06-11
Attending: INTERNAL MEDICINE
Payer: MEDICARE

## 2024-06-11 DIAGNOSIS — N18.32 STAGE 3B CHRONIC KIDNEY DISEASE: ICD-10-CM

## 2024-06-11 LAB
ALBUMIN SERPL BCP-MCNC: 4.1 G/DL (ref 3.5–5.2)
ANION GAP SERPL CALC-SCNC: 10 MMOL/L (ref 8–16)
BUN SERPL-MCNC: 33 MG/DL (ref 8–23)
CALCIUM SERPL-MCNC: 9.6 MG/DL (ref 8.7–10.5)
CHLORIDE SERPL-SCNC: 110 MMOL/L (ref 95–110)
CO2 SERPL-SCNC: 19 MMOL/L (ref 23–29)
CREAT SERPL-MCNC: 1.8 MG/DL (ref 0.5–1.4)
CREAT UR-MCNC: 39 MG/DL (ref 23–375)
EST. GFR  (NO RACE VARIABLE): 37.8 ML/MIN/1.73 M^2
GLUCOSE SERPL-MCNC: 75 MG/DL (ref 70–110)
PHOSPHATE SERPL-MCNC: 3 MG/DL (ref 2.7–4.5)
POTASSIUM SERPL-SCNC: 3.9 MMOL/L (ref 3.5–5.1)
PROT UR-MCNC: <7 MG/DL (ref 0–15)
PROT/CREAT UR: NORMAL MG/G{CREAT} (ref 0–0.2)
PTH-INTACT SERPL-MCNC: 59.1 PG/ML (ref 9–77)
SODIUM SERPL-SCNC: 139 MMOL/L (ref 136–145)

## 2024-06-11 PROCEDURE — 83970 ASSAY OF PARATHORMONE: CPT | Performed by: INTERNAL MEDICINE

## 2024-06-11 PROCEDURE — 36415 COLL VENOUS BLD VENIPUNCTURE: CPT | Performed by: INTERNAL MEDICINE

## 2024-06-11 PROCEDURE — 82570 ASSAY OF URINE CREATININE: CPT | Mod: 91 | Performed by: INTERNAL MEDICINE

## 2024-06-11 PROCEDURE — 80069 RENAL FUNCTION PANEL: CPT | Mod: 59 | Performed by: INTERNAL MEDICINE

## 2024-06-17 ENCOUNTER — OFFICE VISIT (OUTPATIENT)
Dept: NEPHROLOGY | Facility: CLINIC | Age: 79
End: 2024-06-17
Payer: MEDICARE

## 2024-06-17 VITALS
HEART RATE: 58 BPM | DIASTOLIC BLOOD PRESSURE: 60 MMHG | SYSTOLIC BLOOD PRESSURE: 112 MMHG | HEIGHT: 65 IN | BODY MASS INDEX: 27.18 KG/M2

## 2024-06-17 DIAGNOSIS — N20.0 CALCULUS OF KIDNEY: ICD-10-CM

## 2024-06-17 DIAGNOSIS — I10 PRIMARY HYPERTENSION: ICD-10-CM

## 2024-06-17 DIAGNOSIS — N18.32 STAGE 3B CHRONIC KIDNEY DISEASE: Primary | ICD-10-CM

## 2024-06-17 DIAGNOSIS — N26.1 ATROPHIC KIDNEY: ICD-10-CM

## 2024-06-17 DIAGNOSIS — N28.1 KIDNEY CYST, ACQUIRED: ICD-10-CM

## 2024-06-17 PROCEDURE — 1160F RVW MEDS BY RX/DR IN RCRD: CPT | Mod: CPTII,S$GLB,, | Performed by: INTERNAL MEDICINE

## 2024-06-17 PROCEDURE — 1159F MED LIST DOCD IN RCRD: CPT | Mod: CPTII,S$GLB,, | Performed by: INTERNAL MEDICINE

## 2024-06-17 PROCEDURE — 3074F SYST BP LT 130 MM HG: CPT | Mod: CPTII,S$GLB,, | Performed by: INTERNAL MEDICINE

## 2024-06-17 PROCEDURE — 99999 PR PBB SHADOW E&M-EST. PATIENT-LVL II: CPT | Mod: PBBFAC,,, | Performed by: INTERNAL MEDICINE

## 2024-06-17 PROCEDURE — 99214 OFFICE O/P EST MOD 30 MIN: CPT | Mod: S$GLB,,, | Performed by: INTERNAL MEDICINE

## 2024-06-17 PROCEDURE — 3078F DIAST BP <80 MM HG: CPT | Mod: CPTII,S$GLB,, | Performed by: INTERNAL MEDICINE

## 2024-06-17 NOTE — PROGRESS NOTES
"  Subjective:       Patient ID: Derick Cortez is a 78 y.o. White male who presents for return patient evaluation for chronic renal failure.      He states he has an atrophic kidney and has been eating a very low protein diet for some time now.  He has prominent fatigue and has lost some weight.  His only protein intake is usually two eggs in the morning.  He has no uremic or urinary symptoms and is in his usual state of health.  There have been no recent illnesses, hospitalizations or procedures.  He denies NSAIDs.  His baseline Scr back to 2019 is 1.6-1.8.  He found out he had an atrophic kidney 30 years ago.  He is kin to Bertin Chamorro.  He admits he has been told he has incomplete emptying.      Review of Systems   Constitutional:  Positive for fatigue and unexpected weight change. Negative for appetite change, chills and fever.   HENT:  Positive for hearing loss. Negative for congestion.    Eyes:  Negative for visual disturbance.   Respiratory:  Negative for cough and shortness of breath.    Cardiovascular:  Negative for chest pain and leg swelling.   Gastrointestinal:  Negative for abdominal pain, diarrhea, nausea and vomiting.   Genitourinary:  Positive for difficulty urinating. Negative for dysuria and hematuria.   Musculoskeletal:  Positive for arthralgias (knees) and myalgias.   Skin:  Negative for rash.   Neurological:  Negative for headaches.   Psychiatric/Behavioral:  Negative for sleep disturbance.        The past medical, family and social histories were reviewed for this encounter.     Ht 5' 5" (1.651 m)   BMI 27.18 kg/m²     Objective:      Physical Exam  Vitals reviewed.   Constitutional:       General: He is not in acute distress.     Appearance: He is well-developed.   HENT:      Head: Normocephalic and atraumatic.   Eyes:      General: No scleral icterus.     Conjunctiva/sclera: Conjunctivae normal.   Neck:      Vascular: No JVD.   Cardiovascular:      Rate and Rhythm: Normal rate and regular " rhythm.      Heart sounds: Normal heart sounds. No murmur heard.     No friction rub. No gallop.   Pulmonary:      Effort: Pulmonary effort is normal. No respiratory distress.      Breath sounds: Normal breath sounds. No wheezing or rales.   Abdominal:      General: Bowel sounds are normal. There is no distension.      Palpations: Abdomen is soft.      Tenderness: There is no abdominal tenderness.   Musculoskeletal:      Cervical back: Normal range of motion.      Right lower leg: No edema.      Left lower leg: No edema.   Skin:     General: Skin is warm and dry.      Findings: No rash.   Neurological:      Mental Status: He is alert and oriented to person, place, and time.   Psychiatric:         Mood and Affect: Mood normal.         Behavior: Behavior normal.         Assessment:       1. Stage 3b chronic kidney disease    2. Atrophic kidney    3. Primary hypertension    4. Kidney cyst, acquired    5. Calculus of kidney        Lab Results   Component Value Date    CREATININE 2.1 (H) 06/12/2024    BUN 38 (H) 06/12/2024     06/12/2024    K 4.4 06/12/2024     06/12/2024    CO2 19 (L) 06/12/2024     Lab Results   Component Value Date    PTH 59.1 06/11/2024    CALCIUM 9.5 06/12/2024    PHOS 3.0 06/11/2024     Lab Results   Component Value Date    HCT 39.5 (L) 06/12/2024     Prot/Creat Ratio, Urine   Date Value Ref Range Status   06/12/2024 Unable to calculate 0.00 - 0.20 Final   06/11/2024 Unable to calculate 0.00 - 0.20 Final   03/11/2024 0.19 0.00 - 0.20 Final     Plan:   Return to clinic in 6 months.  Labs for next visit include rp pth q 3 months per standing orders.  Labs at [facility] per his request.  UACR for next time.  Baseline creatinine is 1.6-1.9 at least as far back as 2019.  PTH is 59 with a calcium of 9.5.  Renal US shows R 10.9 cm L 9.9 cm.  We discussed diet.  His goal protein intake is 60-70 grams a day.  We discussed his mediations and supplements.  His kidney function is stable.  However the  problem I have is that his L kidney is atrophic.  It is likely chronically obstructed and thus I would not think trying to get it open would result in a significant improvement in his function.  However, he has a large PVR thus if he in not emptying his bladder then I am concerned about him losing function in his solitary kidney.    Computed KFRE 2-Year unavailable. One or more values for this score either were not found within the given timeframe or did not fit some other criterion.    Computed KFRE 5-Year unavailable. One or more values for this score either were not found within the given timeframe or did not fit some other criterion.

## 2024-06-17 NOTE — Clinical Note
However the problem I have is that his L kidney is atrophic.  It is likely chronically obstructed and thus I would not think trying to get it open would result in a significant improvement in his function.  However, he has a large PVR thus if he in not emptying his bladder then I am concerned about him losing function in his solitary kidney.  Does he need a stent or cystoscopy?

## 2024-07-09 ENCOUNTER — PATIENT MESSAGE (OUTPATIENT)
Dept: NEPHROLOGY | Facility: CLINIC | Age: 79
End: 2024-07-09
Payer: MEDICARE

## 2024-09-06 ENCOUNTER — LAB VISIT (OUTPATIENT)
Dept: LAB | Facility: HOSPITAL | Age: 79
End: 2024-09-06
Attending: INTERNAL MEDICINE
Payer: MEDICARE

## 2024-09-06 DIAGNOSIS — N18.32 STAGE 3B CHRONIC KIDNEY DISEASE: ICD-10-CM

## 2024-09-06 LAB
ALBUMIN SERPL BCP-MCNC: 3.7 G/DL (ref 3.5–5.2)
ANION GAP SERPL CALC-SCNC: 8 MMOL/L (ref 8–16)
BUN SERPL-MCNC: 34 MG/DL (ref 8–23)
CALCIUM SERPL-MCNC: 9.2 MG/DL (ref 8.7–10.5)
CHLORIDE SERPL-SCNC: 109 MMOL/L (ref 95–110)
CO2 SERPL-SCNC: 22 MMOL/L (ref 23–29)
CREAT SERPL-MCNC: 2.2 MG/DL (ref 0.5–1.4)
CREAT UR-MCNC: 66 MG/DL (ref 23–375)
EST. GFR  (NO RACE VARIABLE): 29.7 ML/MIN/1.73 M^2
GLUCOSE SERPL-MCNC: 112 MG/DL (ref 70–110)
PHOSPHATE SERPL-MCNC: 4 MG/DL (ref 2.7–4.5)
POTASSIUM SERPL-SCNC: 4.2 MMOL/L (ref 3.5–5.1)
PROT UR-MCNC: <7 MG/DL (ref 0–15)
PROT/CREAT UR: NORMAL MG/G{CREAT} (ref 0–0.2)
PTH-INTACT SERPL-MCNC: 90.1 PG/ML (ref 9–77)
SODIUM SERPL-SCNC: 139 MMOL/L (ref 136–145)

## 2024-09-06 PROCEDURE — 36415 COLL VENOUS BLD VENIPUNCTURE: CPT | Performed by: INTERNAL MEDICINE

## 2024-09-06 PROCEDURE — 80069 RENAL FUNCTION PANEL: CPT | Performed by: INTERNAL MEDICINE

## 2024-09-06 PROCEDURE — 82570 ASSAY OF URINE CREATININE: CPT | Mod: 91 | Performed by: INTERNAL MEDICINE

## 2024-09-06 PROCEDURE — 83970 ASSAY OF PARATHORMONE: CPT | Performed by: INTERNAL MEDICINE

## 2024-09-09 ENCOUNTER — OFFICE VISIT (OUTPATIENT)
Dept: NEPHROLOGY | Facility: CLINIC | Age: 79
End: 2024-09-09
Payer: MEDICARE

## 2024-09-09 VITALS
DIASTOLIC BLOOD PRESSURE: 50 MMHG | BODY MASS INDEX: 27.18 KG/M2 | HEART RATE: 65 BPM | SYSTOLIC BLOOD PRESSURE: 112 MMHG | OXYGEN SATURATION: 98 % | HEIGHT: 65 IN

## 2024-09-09 DIAGNOSIS — N20.0 CALCULUS OF KIDNEY: ICD-10-CM

## 2024-09-09 DIAGNOSIS — N18.32 STAGE 3B CHRONIC KIDNEY DISEASE: Primary | ICD-10-CM

## 2024-09-09 DIAGNOSIS — N26.1 ATROPHIC KIDNEY: ICD-10-CM

## 2024-09-09 DIAGNOSIS — I10 PRIMARY HYPERTENSION: ICD-10-CM

## 2024-09-09 DIAGNOSIS — N28.1 KIDNEY CYST, ACQUIRED: ICD-10-CM

## 2024-09-09 PROCEDURE — 1159F MED LIST DOCD IN RCRD: CPT | Mod: CPTII,S$GLB,, | Performed by: INTERNAL MEDICINE

## 2024-09-09 PROCEDURE — 3074F SYST BP LT 130 MM HG: CPT | Mod: CPTII,S$GLB,, | Performed by: INTERNAL MEDICINE

## 2024-09-09 PROCEDURE — 99999 PR PBB SHADOW E&M-EST. PATIENT-LVL III: CPT | Mod: PBBFAC,,, | Performed by: INTERNAL MEDICINE

## 2024-09-09 PROCEDURE — 3078F DIAST BP <80 MM HG: CPT | Mod: CPTII,S$GLB,, | Performed by: INTERNAL MEDICINE

## 2024-09-09 PROCEDURE — 99214 OFFICE O/P EST MOD 30 MIN: CPT | Mod: S$GLB,,, | Performed by: INTERNAL MEDICINE

## 2024-09-09 PROCEDURE — 1160F RVW MEDS BY RX/DR IN RCRD: CPT | Mod: CPTII,S$GLB,, | Performed by: INTERNAL MEDICINE

## 2024-09-09 NOTE — PROGRESS NOTES
"  Subjective:       Patient ID: Derick Cortez is a 78 y.o. White male who presents for return patient evaluation for chronic renal failure.      He has no uremic or urinary symptoms and is in his usual state of health.  There have been no recent illnesses, hospitalizations or procedures.  He denies NSAIDs.  His baseline Scr back to 2019 is 1.6-1.8.  He found out he had an atrophic kidney 30 years ago.  He is kin to Bertin [employer].  He seems to be emptying better on uroxatral.      Review of Systems   Constitutional:  Positive for fatigue and unexpected weight change. Negative for appetite change, chills and fever.   HENT:  Positive for hearing loss. Negative for congestion.    Eyes:  Negative for visual disturbance.   Respiratory:  Negative for cough and shortness of breath.    Cardiovascular:  Negative for chest pain and leg swelling.   Gastrointestinal:  Negative for abdominal pain, diarrhea, nausea and vomiting.   Genitourinary:  Positive for difficulty urinating. Negative for dysuria and hematuria.   Musculoskeletal:  Positive for arthralgias (knees) and myalgias.   Skin:  Negative for rash.   Neurological:  Negative for headaches.   Psychiatric/Behavioral:  Negative for sleep disturbance.        The past medical, family and social histories were reviewed for this encounter.     BP (!) 112/50 (BP Location: Right arm, Patient Position: Sitting, BP Method: Medium (Manual))   Pulse 65   Ht 5' 5" (1.651 m)   SpO2 98%   BMI 27.18 kg/m²     Objective:      Physical Exam  Vitals reviewed.   Constitutional:       General: He is not in acute distress.     Appearance: He is well-developed.   HENT:      Head: Normocephalic and atraumatic.   Eyes:      General: No scleral icterus.     Conjunctiva/sclera: Conjunctivae normal.   Neck:      Vascular: No JVD.   Cardiovascular:      Rate and Rhythm: Normal rate and regular rhythm.      Heart sounds: Normal heart sounds. No murmur heard.     No friction rub. No gallop. "   Pulmonary:      Effort: Pulmonary effort is normal. No respiratory distress.      Breath sounds: Normal breath sounds. No wheezing or rales.   Abdominal:      General: Bowel sounds are normal. There is no distension.      Palpations: Abdomen is soft.      Tenderness: There is no abdominal tenderness.   Musculoskeletal:      Cervical back: Normal range of motion.      Right lower leg: No edema.      Left lower leg: No edema.   Skin:     General: Skin is warm and dry.      Findings: No rash.   Neurological:      Mental Status: He is alert and oriented to person, place, and time.   Psychiatric:         Mood and Affect: Mood normal.         Behavior: Behavior normal.         Assessment:       1. Stage 3b chronic kidney disease    2. Atrophic kidney    3. Primary hypertension    4. Kidney cyst, acquired    5. Calculus of kidney        Lab Results   Component Value Date    CREATININE 2.2 (H) 09/06/2024    BUN 34 (H) 09/06/2024     09/06/2024    K 4.2 09/06/2024     09/06/2024    CO2 22 (L) 09/06/2024     Lab Results   Component Value Date    PTH 90.1 (H) 09/06/2024    CALCIUM 9.2 09/06/2024    PHOS 4.0 09/06/2024     Lab Results   Component Value Date    HCT 39.5 (L) 06/12/2024     Prot/Creat Ratio, Urine   Date Value Ref Range Status   09/06/2024 Unable to calculate 0.00 - 0.20 Final   09/06/2024 Unable to calculate 0.00 - 0.20 Final   06/12/2024 Unable to calculate 0.00 - 0.20 Final     Plan:   Return to clinic in 6 months.  Labs for next visit include rp pth q 3 months per standing orders.    Baseline creatinine is 1.6-1.9 at least as far back as 2019.  PTH is 90 with a calcium of 9.2.  Renal US shows R 10.9 cm L 9.9 cm.  We discussed diet.  His goal protein intake is 60-70 grams a day.  His kidney function is stable.  His L kidney is atrophic, chronically obstructed and he has a large PVR.  He sees Urology for his bladder.    KFRE 2-Year: 1.9% at 9/6/2024  9:08 AM  Calculated from:  Serum Creatinine: 2.2  mg/dL at 9/6/2024  7:10 AM  Urine Albumin Creatinine Ratio: 22.4 ug/mg at 9/6/2024  9:08 AM  Age: 78 years  Sex: Male at 9/6/2024  9:08 AM  Has CKD-3 to CKD-5: Yes    KFRE 5-Year: 5.8% at 9/6/2024  9:08 AM  Calculated from:  Serum Creatinine: 2.2 mg/dL at 9/6/2024  7:10 AM  Urine Albumin Creatinine Ratio: 22.4 ug/mg at 9/6/2024  9:08 AM  Age: 78 years  Sex: Male at 9/6/2024  9:08 AM  Has CKD-3 to CKD-5: Yes

## 2024-11-05 ENCOUNTER — TELEPHONE (OUTPATIENT)
Dept: OPTOMETRY | Facility: CLINIC | Age: 79
End: 2024-11-05
Payer: MEDICARE

## 2024-12-13 ENCOUNTER — LAB VISIT (OUTPATIENT)
Dept: LAB | Facility: HOSPITAL | Age: 79
End: 2024-12-13
Attending: INTERNAL MEDICINE
Payer: MEDICARE

## 2024-12-13 DIAGNOSIS — N18.32 STAGE 3B CHRONIC KIDNEY DISEASE: ICD-10-CM

## 2024-12-13 LAB
ALBUMIN SERPL BCP-MCNC: 3.7 G/DL (ref 3.5–5.2)
ANION GAP SERPL CALC-SCNC: 8 MMOL/L (ref 8–16)
BUN SERPL-MCNC: 35 MG/DL (ref 8–23)
CALCIUM SERPL-MCNC: 9 MG/DL (ref 8.7–10.5)
CHLORIDE SERPL-SCNC: 113 MMOL/L (ref 95–110)
CO2 SERPL-SCNC: 22 MMOL/L (ref 23–29)
CREAT SERPL-MCNC: 1.9 MG/DL (ref 0.5–1.4)
EST. GFR  (NO RACE VARIABLE): 35.2 ML/MIN/1.73 M^2
GLUCOSE SERPL-MCNC: 97 MG/DL (ref 70–110)
PHOSPHATE SERPL-MCNC: 3.6 MG/DL (ref 2.7–4.5)
POTASSIUM SERPL-SCNC: 4.2 MMOL/L (ref 3.5–5.1)
PTH-INTACT SERPL-MCNC: 92.7 PG/ML (ref 9–77)
SODIUM SERPL-SCNC: 143 MMOL/L (ref 136–145)

## 2024-12-13 PROCEDURE — 80069 RENAL FUNCTION PANEL: CPT | Performed by: INTERNAL MEDICINE

## 2024-12-13 PROCEDURE — 83970 ASSAY OF PARATHORMONE: CPT | Performed by: INTERNAL MEDICINE

## 2024-12-13 PROCEDURE — 36415 COLL VENOUS BLD VENIPUNCTURE: CPT | Performed by: INTERNAL MEDICINE

## 2024-12-30 ENCOUNTER — CLINICAL SUPPORT (OUTPATIENT)
Dept: OPHTHALMOLOGY | Facility: CLINIC | Age: 79
End: 2024-12-30
Payer: MEDICARE

## 2024-12-30 ENCOUNTER — OFFICE VISIT (OUTPATIENT)
Dept: OPHTHALMOLOGY | Facility: CLINIC | Age: 79
End: 2024-12-30
Payer: MEDICARE

## 2024-12-30 DIAGNOSIS — H35.413 LATTICE DEGENERATION OF BOTH RETINAS: ICD-10-CM

## 2024-12-30 DIAGNOSIS — D31.32 CHOROIDAL NEVUS, LEFT EYE: Primary | ICD-10-CM

## 2024-12-30 DIAGNOSIS — H25.13 CATARACT, NUCLEAR SCLEROTIC, BOTH EYES: ICD-10-CM

## 2024-12-30 PROCEDURE — 1101F PT FALLS ASSESS-DOCD LE1/YR: CPT | Mod: CPTII,S$GLB,, | Performed by: OPHTHALMOLOGY

## 2024-12-30 PROCEDURE — 1159F MED LIST DOCD IN RCRD: CPT | Mod: CPTII,S$GLB,, | Performed by: OPHTHALMOLOGY

## 2024-12-30 PROCEDURE — 92014 COMPRE OPH EXAM EST PT 1/>: CPT | Mod: S$GLB,,, | Performed by: OPHTHALMOLOGY

## 2024-12-30 PROCEDURE — 1160F RVW MEDS BY RX/DR IN RCRD: CPT | Mod: CPTII,S$GLB,, | Performed by: OPHTHALMOLOGY

## 2024-12-30 PROCEDURE — 76512 OPH US DX B-SCAN: CPT | Mod: LT,S$GLB,, | Performed by: OPHTHALMOLOGY

## 2024-12-30 PROCEDURE — 3288F FALL RISK ASSESSMENT DOCD: CPT | Mod: CPTII,S$GLB,, | Performed by: OPHTHALMOLOGY

## 2024-12-30 PROCEDURE — 99999 PR PBB SHADOW E&M-EST. PATIENT-LVL III: CPT | Mod: PBBFAC,,, | Performed by: OPHTHALMOLOGY

## 2024-12-30 PROCEDURE — 1126F AMNT PAIN NOTED NONE PRSNT: CPT | Mod: CPTII,S$GLB,, | Performed by: OPHTHALMOLOGY

## 2024-12-30 PROCEDURE — 92250 FUNDUS PHOTOGRAPHY W/I&R: CPT | Mod: S$GLB,,, | Performed by: OPHTHALMOLOGY

## 2024-12-30 NOTE — PROGRESS NOTES
Subjective:       Patient ID: Derick Cortez is a 79 y.o. male      Chief Complaint   Patient presents with    Follow-up     History of Present Illness  HPI    1 yr OCT/DFE/B scan OS    Cc: pt reports vision is stable w/o change.    -blurred Va  --diplopia  --eye pain   --flashes/floaters  --headaches  --curtain/shadow/veils    Eye Meds: NONE                Last edited by Dawit Manriquez MD on 12/30/2024 10:49 AM.        Imaging:    See reports      Assessment/Plan:     1. Choroidal nevus, left eye  Stable on exam compared to fundus photos and testing  Small malignant potential and need for yearly f/u discussed    2. Lattice degeneration of both retinas  Stable  RD precautions discussed in detail, patient expressed understanding  RTC immediately PRN (especially ANY change flashes, floaters, vision, visual field)    3. Acquired ptosis of eyelid, left  S/p surgery with Dr Ibrahim.  Doing well    4. Cataract, nuclear sclerotic, both eyes  Excellent Va.  Observe      Follow up in about 1 year (around 12/30/2025), or if symptoms worsen or fail to improve, for OCT Mac, Dilated examination, Bscan.

## 2024-12-31 ENCOUNTER — TELEPHONE (OUTPATIENT)
Dept: UROLOGY | Facility: CLINIC | Age: 79
End: 2024-12-31
Payer: MEDICARE

## 2025-02-23 NOTE — ADDENDUM NOTE
Addended by: URSULA COVINGTON on: 9/6/2018 10:21 AM     Modules accepted: Orders     23-Feb-2025 11:50

## 2025-03-10 ENCOUNTER — LAB VISIT (OUTPATIENT)
Dept: LAB | Facility: HOSPITAL | Age: 80
End: 2025-03-10
Attending: INTERNAL MEDICINE
Payer: MEDICARE

## 2025-03-10 DIAGNOSIS — N18.32 STAGE 3B CHRONIC KIDNEY DISEASE: ICD-10-CM

## 2025-03-10 DIAGNOSIS — N20.0 CALCULUS OF KIDNEY: ICD-10-CM

## 2025-03-10 DIAGNOSIS — I10 PRIMARY HYPERTENSION: ICD-10-CM

## 2025-03-10 DIAGNOSIS — N26.1 ATROPHIC KIDNEY: ICD-10-CM

## 2025-03-10 DIAGNOSIS — N28.1 KIDNEY CYST, ACQUIRED: ICD-10-CM

## 2025-03-10 LAB
ALBUMIN SERPL BCP-MCNC: 3.5 G/DL (ref 3.5–5.2)
ANION GAP SERPL CALC-SCNC: 9 MMOL/L (ref 8–16)
BUN SERPL-MCNC: 26 MG/DL (ref 8–23)
CALCIUM SERPL-MCNC: 9.2 MG/DL (ref 8.7–10.5)
CHLORIDE SERPL-SCNC: 108 MMOL/L (ref 95–110)
CO2 SERPL-SCNC: 21 MMOL/L (ref 23–29)
CREAT SERPL-MCNC: 1.6 MG/DL (ref 0.5–1.4)
EST. GFR  (NO RACE VARIABLE): 43.3 ML/MIN/1.73 M^2
GLUCOSE SERPL-MCNC: 75 MG/DL (ref 70–110)
PHOSPHATE SERPL-MCNC: 3.9 MG/DL (ref 2.7–4.5)
POTASSIUM SERPL-SCNC: 3.9 MMOL/L (ref 3.5–5.1)
PTH-INTACT SERPL-MCNC: 96.3 PG/ML (ref 9–77)
SODIUM SERPL-SCNC: 138 MMOL/L (ref 136–145)

## 2025-03-10 PROCEDURE — 80069 RENAL FUNCTION PANEL: CPT | Performed by: INTERNAL MEDICINE

## 2025-03-10 PROCEDURE — 36415 COLL VENOUS BLD VENIPUNCTURE: CPT | Performed by: INTERNAL MEDICINE

## 2025-03-10 PROCEDURE — 83970 ASSAY OF PARATHORMONE: CPT | Performed by: INTERNAL MEDICINE

## 2025-03-14 ENCOUNTER — OFFICE VISIT (OUTPATIENT)
Dept: NEPHROLOGY | Facility: CLINIC | Age: 80
End: 2025-03-14
Payer: MEDICARE

## 2025-03-14 VITALS
SYSTOLIC BLOOD PRESSURE: 106 MMHG | DIASTOLIC BLOOD PRESSURE: 58 MMHG | HEIGHT: 65 IN | BODY MASS INDEX: 27.18 KG/M2 | HEART RATE: 58 BPM | OXYGEN SATURATION: 98 %

## 2025-03-14 DIAGNOSIS — N26.1 ATROPHIC KIDNEY: ICD-10-CM

## 2025-03-14 DIAGNOSIS — I10 PRIMARY HYPERTENSION: ICD-10-CM

## 2025-03-14 DIAGNOSIS — N20.0 CALCULUS OF KIDNEY: ICD-10-CM

## 2025-03-14 DIAGNOSIS — N18.32 STAGE 3B CHRONIC KIDNEY DISEASE: Primary | ICD-10-CM

## 2025-03-14 DIAGNOSIS — N28.1 KIDNEY CYST, ACQUIRED: ICD-10-CM

## 2025-03-14 PROCEDURE — 99999 PR PBB SHADOW E&M-EST. PATIENT-LVL III: CPT | Mod: PBBFAC,,, | Performed by: INTERNAL MEDICINE

## 2025-03-14 NOTE — PROGRESS NOTES
"  Subjective:       Patient ID: Derick Cortez is a 79 y.o. White male who presents for return patient evaluation for chronic renal failure.      He has no uremic or urinary symptoms and is in his usual state of health.  There have been no recent illnesses, hospitalizations or procedures.  He denies NSAIDs.  His baseline Scr back to 2019 is 1.6-1.8.  He found out he had an atrophic kidney 30 years ago.  He is kin to Bertin [employer].  He seems to be emptying better on uroxatral.      Review of Systems   Constitutional:  Positive for fatigue and unexpected weight change. Negative for appetite change, chills and fever.   HENT:  Positive for hearing loss. Negative for congestion.    Eyes:  Negative for visual disturbance.   Respiratory:  Negative for cough and shortness of breath.    Cardiovascular:  Negative for chest pain and leg swelling.   Gastrointestinal:  Negative for abdominal pain, diarrhea, nausea and vomiting.   Genitourinary:  Positive for difficulty urinating. Negative for dysuria and hematuria.   Musculoskeletal:  Positive for arthralgias (knees) and myalgias.   Skin:  Negative for rash.   Neurological:  Negative for headaches.   Psychiatric/Behavioral:  Negative for sleep disturbance.        The past medical, family and social histories were reviewed for this encounter.     BP (!) 106/58 (BP Location: Left arm, Patient Position: Sitting)   Pulse (!) 58   Ht 5' 5" (1.651 m)   SpO2 98%   BMI 27.18 kg/m²     Objective:      Physical Exam  Vitals reviewed.   Constitutional:       General: He is not in acute distress.     Appearance: He is well-developed.   HENT:      Head: Normocephalic and atraumatic.   Eyes:      General: No scleral icterus.     Conjunctiva/sclera: Conjunctivae normal.   Neck:      Vascular: No JVD.   Cardiovascular:      Rate and Rhythm: Normal rate and regular rhythm.      Heart sounds: Normal heart sounds. No murmur heard.     No friction rub. No gallop.   Pulmonary:      Effort: " Pulmonary effort is normal. No respiratory distress.      Breath sounds: Normal breath sounds. No wheezing or rales.   Abdominal:      General: Bowel sounds are normal. There is no distension.      Palpations: Abdomen is soft.      Tenderness: There is no abdominal tenderness.   Musculoskeletal:      Cervical back: Normal range of motion.      Right lower leg: No edema.      Left lower leg: No edema.   Skin:     General: Skin is warm and dry.      Findings: No rash.   Neurological:      Mental Status: He is alert and oriented to person, place, and time.   Psychiatric:         Mood and Affect: Mood normal.         Behavior: Behavior normal.         Assessment:       1. Stage 3b chronic kidney disease    2. Atrophic kidney    3. Primary hypertension    4. Kidney cyst, acquired    5. Calculus of kidney        Lab Results   Component Value Date    CREATININE 1.6 (H) 03/10/2025    BUN 26 (H) 03/10/2025     03/10/2025    K 3.9 03/10/2025     03/10/2025    CO2 21 (L) 03/10/2025     Lab Results   Component Value Date    PTH 96.3 (H) 03/10/2025    CALCIUM 9.2 03/10/2025    PHOS 3.9 03/10/2025     Lab Results   Component Value Date    HCT 39.5 (L) 06/12/2024     Prot/Creat Ratio, Urine   Date Value Ref Range Status   09/06/2024 Unable to calculate 0.00 - 0.20 Final   09/06/2024 Unable to calculate 0.00 - 0.20 Final   06/12/2024 Unable to calculate 0.00 - 0.20 Final     Plan:   Return to clinic in 6 months.  Labs for next visit include rp pth q 3 months per standing orders.    Baseline creatinine is 1.6-1.9 at least as far back as 2019.  PTH is 96 with a calcium of 9.2.  Start D3 2000 units daily.  Renal US shows R 10.9 cm L 9.9 cm.  We discussed diet.  His goal protein intake is 60-70 grams a day.  His kidney function is stable.  His L kidney is atrophic, chronically obstructed and he has a large PVR.  He sees Urology for his bladder.    KFRE 2-Year: 0.4% at 3/10/2025  6:58 AM  Calculated from:  Serum Creatinine:  1.6 mg/dL at 3/10/2025  6:58 AM  Urine Albumin Creatinine Ratio: 22.4 ug/mg at 9/6/2024  9:08 AM  Age: 79 years  Sex: Male at 3/10/2025  6:58 AM  Has CKD-3 to CKD-5: Yes    KFRE 5-Year: 1.3% at 3/10/2025  6:58 AM  Calculated from:  Serum Creatinine: 1.6 mg/dL at 3/10/2025  6:58 AM  Urine Albumin Creatinine Ratio: 22.4 ug/mg at 9/6/2024  9:08 AM  Age: 79 years  Sex: Male at 3/10/2025  6:58 AM  Has CKD-3 to CKD-5: Yes

## 2025-04-24 ENCOUNTER — TELEPHONE (OUTPATIENT)
Dept: NEPHROLOGY | Facility: CLINIC | Age: 80
End: 2025-04-24
Payer: MEDICARE

## 2025-04-24 NOTE — TELEPHONE ENCOUNTER
----- Message from Noa sent at 4/24/2025  2:12 PM CDT -----  Type:  Needs Medical AdviceWho Called: pt Symptoms (please be specific): vitamin concerns  How long has patient had these symptoms:  about a month going on two Would the patient rather a call back or a response via [facility]? Call back Best Call Back Number: [phone] Information: pt is taking vitamin D 2,  once every two weeks , pt would like to know if he should still be taking the D-2 in addition to prescribed Vitamin D,  please call to advise, Thank You.

## 2025-04-24 NOTE — TELEPHONE ENCOUNTER
Returned call to patient. Patient inquired if he needed to keep taking his D2 50,000 U Q2 Weeks with his OTC vitamin D 2,000 U daily. Patient advised per MD to stop D2 50,000 U rx and only take OTC vitamin D rx as advised. Patient verbalized understanding.

## 2025-05-19 ENCOUNTER — OFFICE VISIT (OUTPATIENT)
Dept: UROLOGY | Facility: CLINIC | Age: 80
End: 2025-05-19
Payer: MEDICARE

## 2025-05-19 VITALS — BODY MASS INDEX: 26.48 KG/M2 | WEIGHT: 158.94 LBS | HEIGHT: 65 IN

## 2025-05-19 DIAGNOSIS — N13.30 HYDRONEPHROSIS OF LEFT KIDNEY: ICD-10-CM

## 2025-05-19 DIAGNOSIS — N40.0 BPH WITHOUT URINARY OBSTRUCTION: Primary | ICD-10-CM

## 2025-05-19 DIAGNOSIS — N26.1 ATROPHIC KIDNEY: ICD-10-CM

## 2025-05-19 DIAGNOSIS — R33.9 INCOMPLETE BLADDER EMPTYING: ICD-10-CM

## 2025-05-19 LAB
BILIRUBIN, UA POC OHS: NEGATIVE
BLOOD, UA POC OHS: NEGATIVE
CLARITY, UA POC OHS: CLEAR
COLOR, UA POC OHS: YELLOW
GLUCOSE, UA POC OHS: NEGATIVE
KETONES, UA POC OHS: NEGATIVE
LEUKOCYTES, UA POC OHS: NEGATIVE
NITRITE, UA POC OHS: NEGATIVE
PH, UA POC OHS: 5.5
POC RESIDUAL URINE VOLUME: 345 ML (ref 0–100)
PROTEIN, UA POC OHS: NEGATIVE
SPECIFIC GRAVITY, UA POC OHS: 1.01
UROBILINOGEN, UA POC OHS: 0.2

## 2025-05-19 PROCEDURE — 99999 PR PBB SHADOW E&M-EST. PATIENT-LVL II: CPT | Mod: PBBFAC,,, | Performed by: STUDENT IN AN ORGANIZED HEALTH CARE EDUCATION/TRAINING PROGRAM

## 2025-05-19 PROCEDURE — 99214 OFFICE O/P EST MOD 30 MIN: CPT | Mod: S$GLB,,, | Performed by: STUDENT IN AN ORGANIZED HEALTH CARE EDUCATION/TRAINING PROGRAM

## 2025-05-19 PROCEDURE — 3288F FALL RISK ASSESSMENT DOCD: CPT | Mod: CPTII,S$GLB,, | Performed by: STUDENT IN AN ORGANIZED HEALTH CARE EDUCATION/TRAINING PROGRAM

## 2025-05-19 PROCEDURE — 81003 URINALYSIS AUTO W/O SCOPE: CPT | Mod: QW,S$GLB,, | Performed by: STUDENT IN AN ORGANIZED HEALTH CARE EDUCATION/TRAINING PROGRAM

## 2025-05-19 PROCEDURE — 1101F PT FALLS ASSESS-DOCD LE1/YR: CPT | Mod: CPTII,S$GLB,, | Performed by: STUDENT IN AN ORGANIZED HEALTH CARE EDUCATION/TRAINING PROGRAM

## 2025-05-19 PROCEDURE — 1159F MED LIST DOCD IN RCRD: CPT | Mod: CPTII,S$GLB,, | Performed by: STUDENT IN AN ORGANIZED HEALTH CARE EDUCATION/TRAINING PROGRAM

## 2025-05-19 PROCEDURE — 51798 US URINE CAPACITY MEASURE: CPT | Mod: S$GLB,,, | Performed by: STUDENT IN AN ORGANIZED HEALTH CARE EDUCATION/TRAINING PROGRAM

## 2025-05-19 PROCEDURE — 1160F RVW MEDS BY RX/DR IN RCRD: CPT | Mod: CPTII,S$GLB,, | Performed by: STUDENT IN AN ORGANIZED HEALTH CARE EDUCATION/TRAINING PROGRAM

## 2025-05-19 RX ORDER — ALFUZOSIN HYDROCHLORIDE 10 MG/1
10 TABLET, EXTENDED RELEASE ORAL
Qty: 90 TABLET | Refills: 3 | Status: SHIPPED | OUTPATIENT
Start: 2025-05-19 | End: 2026-05-19

## 2025-05-19 RX ORDER — CHOLECALCIFEROL (VITAMIN D3) 25 MCG
1000 TABLET ORAL DAILY
COMMUNITY

## 2025-05-19 NOTE — PROGRESS NOTES
"[facility]   Clinic Note    Subjective:     Chief Complaint: Benign Prostatic Hypertrophy      History of Present Illness:  Derick Cortez is a 79 y.o. male who presents to clinic for evaluation and management of hydronephrosis. He is established to our clinic but new to me    Incomplete bladder emptying:  He does not report significant LUTS. IPSS is 2/1. He is on daily cialis. He has had PVRs 574 mL - 599 mL. He was started on Uroxatral. Estimated prostate volume from CT is 32 cc.   PVR today is 345 mL.     Left hydronephrosis:  He has left UVJ obstruction. Following for many years dating back to at least NM renal scan done in 2013 - "atrophic hydronephrotic poorly functioning left kidney, with no appreciable response to lasix. Minimally diminished right renal function, but with no evidence for hydronephrosis". Differential renal function was 26% left, and 74% right.      Repeat NM renal scan 12/2023 and CT RSS 1/2/2024 - showed left hydroureteronephrosis to the level of the bladder with severe atrophy of the left kidney. There is a proteinaceous / hemorrhagic cyst on the left as well. 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   NM renal scan showed uptake of 34% on the left and 65% on the right. Diuretic T 1/2 is 9.38 on the right and unmeasurable on the left.     He has not had infections. Renal function is stable. Creatinine reviewed below.    Past medical, family, surgical and social history reviewed as documented in chart with pertinent positive medical, family, surgical and social history detailed in HPI.    A review systems was conducted with pertinent positive and negative findings documented in HPI.    Objective:     Estimated body mass index is 26.45 kg/m² as calculated from the following:    Height as of this encounter: 5' 5" (1.651 m).    Weight as of this encounter: 72.1 kg (158 lb 15.2 oz).    Vital Signs (Most Recent)       Physical Exam  Constitutional:       General: He " is not in acute distress.     Appearance: He is not ill-appearing or toxic-appearing.   Pulmonary:      Effort: Pulmonary effort is normal. No accessory muscle usage or respiratory distress.   Neurological:      Mental Status: He is alert.         Labs reviewed below:  Lab Results   Component Value Date    BUN 26 (H) 03/10/2025    CREATININE 1.6 (H) 03/10/2025    WBC 6.38 06/12/2024    HGB 13.4 (L) 06/12/2024    HCT 39.5 (L) 06/12/2024     06/12/2024    AST 16 02/28/2025    ALT 33 02/28/2025    ALKPHOS 85 02/28/2025    ALBUMIN 3.5 03/10/2025    HGBA1C 5.2 06/17/2024      Urine dipstick today was negative for blood, leukocytes, and nitrites.     Assessment:     1. BPH without urinary obstruction    2. Incomplete bladder emptying    3. Hydronephrosis of left kidney    4. Atrophic kidney        Plan:     Elevated PVR but minimal LUTS.   We discussed indications for intervention  Continue cialis and uroxatral    Left UVJ obstruction - longstanding issue without significant change. The function of the kidney is likely less than the most recent scan showed. He has no pain and no infections. His renal function is relatively stable and has been chronic. Continue observation    Follow up in 1 year with a PVR    Fletcher Escobedo MD

## 2025-06-16 ENCOUNTER — LAB VISIT (OUTPATIENT)
Dept: LAB | Facility: HOSPITAL | Age: 80
End: 2025-06-16
Attending: INTERNAL MEDICINE
Payer: MEDICARE

## 2025-06-16 DIAGNOSIS — N26.1 ATROPHIC KIDNEY: ICD-10-CM

## 2025-06-16 DIAGNOSIS — N28.1 KIDNEY CYST, ACQUIRED: ICD-10-CM

## 2025-06-16 DIAGNOSIS — I10 PRIMARY HYPERTENSION: ICD-10-CM

## 2025-06-16 DIAGNOSIS — N18.32 STAGE 3B CHRONIC KIDNEY DISEASE: ICD-10-CM

## 2025-06-16 DIAGNOSIS — N20.0 CALCULUS OF KIDNEY: ICD-10-CM

## 2025-06-16 LAB
ALBUMIN SERPL BCP-MCNC: 4 G/DL (ref 3.5–5.2)
ANION GAP (OHS): 8 MMOL/L (ref 8–16)
BUN SERPL-MCNC: 30 MG/DL (ref 8–23)
CALCIUM SERPL-MCNC: 9 MG/DL (ref 8.7–10.5)
CHLORIDE SERPL-SCNC: 111 MMOL/L (ref 95–110)
CO2 SERPL-SCNC: 21 MMOL/L (ref 23–29)
CREAT SERPL-MCNC: 1.7 MG/DL (ref 0.5–1.4)
GFR SERPLBLD CREATININE-BSD FMLA CKD-EPI: 40 ML/MIN/1.73/M2
GLUCOSE SERPL-MCNC: 86 MG/DL (ref 70–110)
PHOSPHATE SERPL-MCNC: 3.7 MG/DL (ref 2.7–4.5)
POTASSIUM SERPL-SCNC: 4.2 MMOL/L (ref 3.5–5.1)
PTH-INTACT SERPL-MCNC: 56.5 PG/ML (ref 9–77)
SODIUM SERPL-SCNC: 140 MMOL/L (ref 136–145)

## 2025-06-16 PROCEDURE — 82374 ASSAY BLOOD CARBON DIOXIDE: CPT

## 2025-06-16 PROCEDURE — 83970 ASSAY OF PARATHORMONE: CPT

## 2025-06-16 PROCEDURE — 36415 COLL VENOUS BLD VENIPUNCTURE: CPT

## (undated) DEVICE — BLADE SURG #15 CARBON STEEL

## (undated) DEVICE — COVER LIGHT HANDLE 80/CA

## (undated) DEVICE — CUP MEDICINE STERILE 2OZ

## (undated) DEVICE — GAUZE SPONGE 4X4 12PLY

## (undated) DEVICE — ELECTRODE REM PLYHSV RETURN 9

## (undated) DEVICE — TRAY MUSCLE LID EYE

## (undated) DEVICE — GAUZE SPONGE 4'X4 12 PLY

## (undated) DEVICE — SOL WATER STRL IRR 1000ML

## (undated) DEVICE — DRAPE STERI-DRAPE 1000 17X11IN

## (undated) DEVICE — SHEET EENT SPLIT

## (undated) DEVICE — SYR 10CC LUER LOCK

## (undated) DEVICE — SKINMARKER & RULER REGULAR X-F

## (undated) DEVICE — SOL BSS BALANCED SALT

## (undated) DEVICE — NDL 22GA X1 1/2 REG BEVEL

## (undated) DEVICE — DROPPER MEDICINE

## (undated) DEVICE — SEE MEDLINE ITEM 152622

## (undated) DEVICE — GOWN SURGICAL X-LARGE

## (undated) DEVICE — APPLICATOR STERILE 3IN

## (undated) DEVICE — NDL STRAIGHT 4CM LEIBINGER

## (undated) DEVICE — DRAPE STERI INSTRUMENT 1018

## (undated) DEVICE — PENCIL ROCKER SWITCH 10FT CORD

## (undated) DEVICE — SUT 6/0 18IN PROLENE BL MO

## (undated) DEVICE — SOL BETADINE 5%

## (undated) DEVICE — CLEANER TIP ELECSURG 2X2IN

## (undated) DEVICE — NDL HYPO A BEVEL 30X1/2